# Patient Record
Sex: MALE | Race: WHITE | NOT HISPANIC OR LATINO | Employment: OTHER | URBAN - NONMETROPOLITAN AREA
[De-identification: names, ages, dates, MRNs, and addresses within clinical notes are randomized per-mention and may not be internally consistent; named-entity substitution may affect disease eponyms.]

---

## 2024-08-22 ENCOUNTER — TRANSCRIBE ORDERS (OUTPATIENT)
Dept: CARDIAC REHAB | Facility: HOSPITAL | Age: 64
End: 2024-08-22

## 2024-08-22 DIAGNOSIS — I20.9 ANGINA, CLASS III (CMS-HCC): Primary | ICD-10-CM

## 2024-08-23 ENCOUNTER — CLINICAL SUPPORT (OUTPATIENT)
Dept: CARDIAC REHAB | Facility: HOSPITAL | Age: 64
End: 2024-08-23
Payer: MEDICARE

## 2024-08-23 VITALS
DIASTOLIC BLOOD PRESSURE: 75 MMHG | BODY MASS INDEX: 41.04 KG/M2 | SYSTOLIC BLOOD PRESSURE: 123 MMHG | OXYGEN SATURATION: 97 % | HEIGHT: 68 IN | WEIGHT: 270.8 LBS

## 2024-08-23 ASSESSMENT — PATIENT HEALTH QUESTIONNAIRE - PHQ9
3. TROUBLE FALLING OR STAYING ASLEEP OR SLEEPING TOO MUCH: NOT AT ALL
7. TROUBLE CONCENTRATING ON THINGS, SUCH AS READING THE NEWSPAPER OR WATCHING TELEVISION: NOT AT ALL
SUM OF ALL RESPONSES TO PHQ QUESTIONS 1-9: 2
6. FEELING BAD ABOUT YOURSELF - OR THAT YOU ARE A FAILURE OR HAVE LET YOURSELF OR YOUR FAMILY DOWN: NOT AT ALL
4. FEELING TIRED OR HAVING LITTLE ENERGY: MORE THAN HALF THE DAYS
5. POOR APPETITE OR OVEREATING: NOT AT ALL
8. MOVING OR SPEAKING SO SLOWLY THAT OTHER PEOPLE COULD HAVE NOTICED. OR THE OPPOSITE, BEING SO FIGETY OR RESTLESS THAT YOU HAVE BEEN MOVING AROUND A LOT MORE THAN USUAL: NOT AT ALL
1. LITTLE INTEREST OR PLEASURE IN DOING THINGS: NOT AT ALL
SUM OF ALL RESPONSES TO PHQ QUESTIONS 1-9: 2
2. FEELING DOWN, DEPRESSED OR HOPELESS: NOT AT ALL
9. THOUGHTS THAT YOU WOULD BE BETTER OFF DEAD, OR OF HURTING YOURSELF: NOT AT ALL
SUM OF ALL RESPONSES TO PHQ9 QUESTIONS 1 & 2: 0

## 2024-08-23 ASSESSMENT — DUKE ACTIVITY SCORE INDEX (DASI)
CAN YOU PARTICIPATE IN STRENOUS SPORTS LIKE SWIMMING, SINGLES TENNIS, FOOTBALL, BASKETBALL, OR SKIING: NO
CAN YOU WALK A BLOCK OR TWO ON LEVEL GROUND: YES
CAN YOU TAKE CARE OF YOURSELF (EAT, DRESS, BATHE, OR USE TOILET): YES
CAN YOU HAVE SEXUAL RELATIONS: YES
CAN YOU CLIMB A FLIGHT OF STAIRS OR WALK UP A HILL: YES
CAN YOU DO LIGHT WORK AROUND THE HOUSE LIKE DUSTING OR WASHING DISHES: YES
CAN YOU DO YARD WORK LIKE RAKING LEAVES, WEEDING OR PUSHING A MOWER: YES
CAN YOU PARTICIPATE IN MODERATE RECREATIONAL ACTIVITIES LIKE GOLF, BOWLING, DANCING, DOUBLES TENNIS OR THROWING A BASEBALL OR FOOTBALL: YES
CAN YOU RUN A SHORT DISTANCE: YES
CAN YOU DO HEAVY WORK AROUND THE HOUSE LIKE SCRUBBING FLOORS OR LIFTING AND MOVING HEAVY FURNITURE: YES
DASI METS SCORE: 9
TOTAL_SCORE: 50.7
CAN YOU DO MODERATE WORK AROUND THE HOUSE LIKE VACUUMING, SWEEPING FLOORS OR CARRYING GROCERIES: YES
CAN YOU WALK INDOORS, SUCH AS AROUND YOUR HOUSE: YES

## 2024-08-23 NOTE — PROGRESS NOTES
"  Cardiac Rehabilitation Initial Treatment Plan    Name: Michael Mehta  Medical Record Number: [id number]  YOB: 1960  Age: 63 y.o.    Today’s Date: 8/23/2024  Primary Care Physician: Titi Benavidez MD  Referring Physician: Titi Benavidez MD  Program Location: [address]  Primary Diagnosis: Angina Class 3 (120.9)  Onset/Date of Diagnosis: 8/9/2024    Past Medical History  Severe Coronary artery Disease  CORNELIA  CABG 2015  Type 2 Diabetic  Hyperlipidemia  Right knee arthroscopic surgery  Orthostatic hypotension  Pilonidal cyst excision  Multiple cardiac stents      Initial Assessment, not yet started program.    AACVPR Risk Stratification: High (patient still having events of angina- no use of nitro). Patient also has multiple blockages per heart catherization report from 8/9/2024.      Falls Risk: Medium  Psychosocial Assessment     Pre PHQ-9: 2      Sent PH-Q 9 to MD if score > 20: No; score < 20    Pt reported/currently experiencing stress: Yes; Stress; Severity: moderate rated at 6 out of 10  Patient uses stress management skills: Yes   History of: anxiety anxiety reported at a 6 out of 10 score  Currently seeing a mental health provider: No  Social Support: Yes, Whom:Family and [employer]  Quality of Life Survey: SF-36   SF-36 Pre Post   Physical Component Score 39.58 TBD   Mental Component Score 50.65 TBD     Learning Assessment:  Learning assessment/barriers: None  Preferred learning method:  no preference  Barriers: None  Comments:    Stages of Change:Preparation    Psychosocial Plan    Goal Status: Initial Assessment; goals not yet started    Psychosocial Goals:  Decrease phq-9 score by at least 1 point. Patient scored a phq-9 score of 2. In category \"none to minimal. Decrease the amount of days the patient feels tired or has little energy from more than half the days to a few or none.   Learn Stress management skills and apply them to reported stressors while in the " "program.  Question on new or current psychological issues at least every 30 days.  Improve PCS and MCS scores by at least 5 points by discharge.  Educate patient on ways to reduce stress by discharge.  Provide 1:1 emotional support as needed and facilitate peer support within the context of the other phase 2 patients.       Psychosocial Interventions/Education:   Encourage patient to complete all emotional health and stress reduction activities and worksheets provided throughout the program. Patient verbalized understanding.      Initial Assessment:      Nutrition Assessment:    Hyperlipidemia: Yes     Lipids:   No results found for: \"CHOL\", \"HDL\", \"LDLF\", \"TRIG\"    Current Dietary Guidelines:  patient denies any current following of a diet plan  Barriers to dietary change: no    Diet Habit Survey: Picture Your Plate  Pre:  55  Post: To be done at discharge.    Diabetes Assessment    No results found for: \"HGBA1C\"    History of Diabetes: Yes Pt monitors BS at home: Yes   Frequency: 2 /day  FBS range: 180 - 320  Hypoglycemic Episodes: No     Weight Management  Height: 68 inches  Weight: 270.8 lbs  Waist: 53 inches  BMI:  41.18    Nutrition Plan    Goal Status: Initial Assessment; goals not yet started    Nutrition Goals:   Increase PYP score to greater than 60 by discharge. Patient scored 55 on PYP score. In category, \"a more healthful dietary pattern, with some room for improvement.\"   Provide education on nutritional aspects related to cardiac health and discuss dietary improvements while in the program.   Lose 1/2 inch from waist by discharge. (53\" On admission).  Gain knowledge on lipids and have a better understanding of lab results during the program.   Discuss PYP scores within the first 12 sessions of the program.  Learn ways to improve meal planning. Patient is a  and works long hours. 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 Patient has not managed packing or prepping food to eat " "throughout the day.   Refer to Diabetic educator for tips and suggestions on maintenance and diet planning.       Nutrition Interventions/Education:  Informed patient that nutritional topics will be discussed including following of the Mediterranean Diet. Patient verbalized understanding.   Explained to patient that education will be completed with book and workbook and expected for completion. Different topics will be discussed throughout the program. Patient acknowledged understanding.        Initial Assessment:      Exercise Assessment    No  Mode: NA  Frequency: NA  Duration: NA    Exercise Prescription     Exercise Prescription based on: 6 Minute Walk Test          Frequency:  3 days/week   Mode: Treadmill, NuStep, and Recumbent Cycle   Duration: >30 total aerobic minutes   Intensity: RPE <15  Target HR:   Rest+30  MET Level: 6 mw 2.80  Patient wears supplemental O2: No; patient wears CPAP at night     Modality Workload METs Duration (minutes)   1 Pre-Exercise   5:00   2 Treadmill 2.3  2.8 12 :00   3 Recumbent Bike 19w@1  2.8 12 :00   4 NuStep 20w@1  2.8 12 :00   5 Cooldown    5 :00   6 Post-Exercise   5:00    MAX HR: 124 bpm-132 bpm  Resistance Training: Yes, will begin at session 9   Home Exercise Prescription given: To be given at discharge.     DASI Score: 50.7  met score: 9    Exercise Plan    Goal Status: Initial Assessment; goals not yet started    Exercise Goals:   Increase mets to 4.0 by the end of the program.  Gain independence and confidence with exercise while in the program.  Have a plan to continue exercise after he completes the program.  Increase mets by 1.0 every 6 weeks as tolerated.      Exercise Interventions/Education:   \"What are exactly mets\" handout provided and discussed. Patient understood well.  NAGI handout provided and discussed. Patient verbalized understanding.          Initial Assessment:      Other Core Components/Risk Factor Assessment:    Medication adherence  Current " "Medications:    Norvasc 5 mg daily   Asprin 81 mg daily  Plavix 75mg daily  Amaryl 1 mg bid  Mevacor 40mg daily  Magnesium 400 mg 2 tabs BID  Glucophage 500mg BID  Lopressor 25 mg  (1/2 tab daily)                   Medication compliance: Yes   Uses pill box/organizer: Yes    Carries medication list: Yes     Blood Pressure Management  History of Hypertension: Yes   Medication Changes: Yes  recenty amlodapine 5 mg was told to take BID  Resting BP: 123/75    Heart Failure Management  Hx of Heart Failure: patient denies any history  Type (selection):  patient denies  Most recent EF: 40%    Smoking/Tobacco Assessment  Social History     Tobacco Use   Smoking Status Not on file   Smokeless Tobacco Not on file       Other Core Component Plan    Goal Status:  Initial assessment    Other Core Component Goals:   Maintain a resting BP of <130/80 while in the program  Increase knowledge test score from 10/15 to 15/15 by discharge.  Monitor blood pressure pre, during and post workout.  Meet and discuss knowledge test within the first 12 sessions.       Other Core Component Interventions/Education:  Educated the patient on the importance of carrying an updating medication list. Patient does carry a med list in wallet.  2.  Handout given, \"Tips on taking medication.\" Patient understood well.  3.  Handout, \"[facility] Benefits of Cardiac Rehab,\" and [facility]HA's \"What is Cardiac Rehab\" provided to patient. Explained what the goals of cardiac rehab are and program details such as nutritional and stress management.\" Patient understood well.      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 Patient completed all entry paperwork. Patient completed 6 minute walk with slight shortness of breath. Patient was able to walk 1240 feet and " obtained 2.80 mets. Patient walked at a speed of 2.34 mph. Patient did have slight pain in hips with walk. Patient reported pain at 3 out of 10. Patient still has angina at times that improves with rest. Dr. Titi Hart, referring physician is aware. Will monitor and follow up as needed and will follow cardiac rehab protocols. Patient has participated in a cardiac rehab in September 2023 at another facility.   Patient would like to begin cardiac rehab on Wednesday, August 28, 2024.    Rehab Staff Signature: Ambar Segundo, RRT

## 2024-08-28 ENCOUNTER — CLINICAL SUPPORT (OUTPATIENT)
Dept: CARDIAC REHAB | Facility: HOSPITAL | Age: 64
End: 2024-08-28
Payer: MEDICARE

## 2024-08-28 DIAGNOSIS — I20.9 ANGINA, CLASS III (CMS-HCC): Primary | ICD-10-CM

## 2024-08-28 PROCEDURE — 93798 PHYS/QHP OP CAR RHAB W/ECG: CPT | Performed by: STUDENT IN AN ORGANIZED HEALTH CARE EDUCATION/TRAINING PROGRAM

## 2024-08-28 PROCEDURE — 94618 PULMONARY STRESS TESTING: CPT | Performed by: STUDENT IN AN ORGANIZED HEALTH CARE EDUCATION/TRAINING PROGRAM

## 2024-08-30 ENCOUNTER — CLINICAL SUPPORT (OUTPATIENT)
Dept: CARDIAC REHAB | Facility: HOSPITAL | Age: 64
End: 2024-08-30
Payer: MEDICARE

## 2024-08-30 DIAGNOSIS — I20.9 ANGINA, CLASS III (CMS-HCC): Primary | ICD-10-CM

## 2024-08-30 PROCEDURE — 93798 PHYS/QHP OP CAR RHAB W/ECG: CPT | Performed by: STUDENT IN AN ORGANIZED HEALTH CARE EDUCATION/TRAINING PROGRAM

## 2024-09-04 ENCOUNTER — CLINICAL SUPPORT (OUTPATIENT)
Dept: CARDIAC REHAB | Facility: HOSPITAL | Age: 64
End: 2024-09-04
Payer: MEDICARE

## 2024-09-04 DIAGNOSIS — I20.9 ANGINA, CLASS III (CMS-HCC): Primary | ICD-10-CM

## 2024-09-04 PROCEDURE — 93798 PHYS/QHP OP CAR RHAB W/ECG: CPT | Performed by: STUDENT IN AN ORGANIZED HEALTH CARE EDUCATION/TRAINING PROGRAM

## 2024-09-06 ENCOUNTER — CLINICAL SUPPORT (OUTPATIENT)
Dept: CARDIAC REHAB | Facility: HOSPITAL | Age: 64
End: 2024-09-06
Payer: MEDICARE

## 2024-09-06 DIAGNOSIS — I20.9 ANGINA, CLASS III (CMS-HCC): Primary | ICD-10-CM

## 2024-09-06 PROCEDURE — 93798 PHYS/QHP OP CAR RHAB W/ECG: CPT | Performed by: STUDENT IN AN ORGANIZED HEALTH CARE EDUCATION/TRAINING PROGRAM

## 2024-09-09 ENCOUNTER — APPOINTMENT (OUTPATIENT)
Dept: CARDIAC REHAB | Facility: HOSPITAL | Age: 64
End: 2024-09-09
Payer: MEDICARE

## 2024-09-11 ENCOUNTER — CLINICAL SUPPORT (OUTPATIENT)
Dept: CARDIAC REHAB | Facility: HOSPITAL | Age: 64
End: 2024-09-11
Payer: MEDICARE

## 2024-09-11 DIAGNOSIS — I20.9 ANGINA, CLASS III (CMS-HCC): Primary | ICD-10-CM

## 2024-09-11 PROCEDURE — 93798 PHYS/QHP OP CAR RHAB W/ECG: CPT | Performed by: STUDENT IN AN ORGANIZED HEALTH CARE EDUCATION/TRAINING PROGRAM

## 2024-09-13 ENCOUNTER — CLINICAL SUPPORT (OUTPATIENT)
Dept: CARDIAC REHAB | Facility: HOSPITAL | Age: 64
End: 2024-09-13
Payer: MEDICARE

## 2024-09-13 DIAGNOSIS — I20.9 ANGINA, CLASS III (CMS-HCC): Primary | ICD-10-CM

## 2024-09-13 PROCEDURE — 93798 PHYS/QHP OP CAR RHAB W/ECG: CPT | Performed by: STUDENT IN AN ORGANIZED HEALTH CARE EDUCATION/TRAINING PROGRAM

## 2024-09-16 ENCOUNTER — CLINICAL SUPPORT (OUTPATIENT)
Dept: CARDIAC REHAB | Facility: HOSPITAL | Age: 64
End: 2024-09-16
Payer: MEDICARE

## 2024-09-16 DIAGNOSIS — I20.9 ANGINA, CLASS III (CMS-HCC): Primary | ICD-10-CM

## 2024-09-16 PROCEDURE — 93798 PHYS/QHP OP CAR RHAB W/ECG: CPT | Performed by: STUDENT IN AN ORGANIZED HEALTH CARE EDUCATION/TRAINING PROGRAM

## 2024-09-18 ENCOUNTER — CLINICAL SUPPORT (OUTPATIENT)
Dept: CARDIAC REHAB | Facility: HOSPITAL | Age: 64
End: 2024-09-18
Payer: MEDICARE

## 2024-09-18 DIAGNOSIS — I20.9 ANGINA, CLASS III (CMS-HCC): Primary | ICD-10-CM

## 2024-09-18 PROCEDURE — 93798 PHYS/QHP OP CAR RHAB W/ECG: CPT | Performed by: STUDENT IN AN ORGANIZED HEALTH CARE EDUCATION/TRAINING PROGRAM

## 2024-09-18 NOTE — PROGRESS NOTES
"  Cardiac Rehabilitation 30 Day Assessment    Name: Michael Mehta  Medical Record Number: [id number]  YOB: 1960  Age: 63 y.o.    Today’s Date: 9/18/2024  Primary Care Physician: Titi Benavidez MD  Referring Physician: Titi Benavidez MD  Program Location: [address]   Exercise Start Date: 8/28/2024    General  Primary Diagnosis: Angina Class 3 (120.9)  Onset/Date of Diagnosis: 8/9/2024    Past Medical History  Severe Coronary artery Disease  CORNELIA  CABG 2015  Type 2 Diabetic  Hyperlipidemia  Right knee arthroscopic surgery  Orthostatic hypotension  Pilonidal cyst excision  Multiple cardiac stents      AACVPR Risk Stratification: High (patient still having events of angina- no use of nitro). Patient also has multiple blockages per heart catherization report from 8/9/2024.      Falls Risk: Medium  Psychosocial Assessment     Pre PHQ-9: 2  Post PHQ-9: To be done at discharge:     Sent PH-Q 9 to MD if score > 20: No; score < 20    Pt reported/currently experiencing stress: Yes; Stress; Severity: moderate rated at 6 out of 10  Patient uses stress management skills: Yes   History of: anxiety anxiety reported at a 6 out of 10 score  Currently seeing a mental health provider: No  Social Support: Yes, Whom: Family and [employer]  Quality of Life Survey: SF-36   SF-36 Pre Post   Physical Component Score 39.58 TBD   Mental Component Score 50.65 TBD     Learning Assessment:  Learning assessment/barriers: None  Preferred learning method:  no preference  Barriers: None  Comments:    Stages of Change: Action    Psychosocial Plan    Goal Status: In progress    Psychosocial Goals:  Decrease phq-9 score by at least 1 point. Patient scored a phq-9 score of 2. In category \"none to minimal. Decrease the amount of days the patient feels tired or has little energy from more than half the days to a few or none ( in progress).   Learn Stress management skills and apply them to reported stressors while in the " "program (in progress).   Question on new or current psychological issues at least every 30 days (in progress).  Improve PCS and MCS scores by at least 5 points by discharge (in progress).  Educate patient on ways to reduce stress by discharge (in progress).  Provide 1:1 emotional support as needed and facilitate peer support within the context of the other phase 2 patients (in progress).      Psychosocial Interventions/Education:   Encourage patient to complete all emotional health and stress reduction activities and worksheets provided throughout the program. Patient verbalized understanding.  Stress reported at 6 out of 10. Patient feels he has been more \"down\" lately. Encouraged and offered suggestions. Patient given handouts on \"Stress management,\" and \"Stress less for a healthier heart.\" Patient acknowledged understanding, 09/18/2024.        Nutrition Assessment:    Hyperlipidemia: Yes     Lipids:   Obtained outside labs from [facility]:  07/27/2024    Cholesterol: 166  HDL: 35  Triglycerides: 170  LDL: 97      Current Dietary Guidelines:  patient denies any current following of a diet plan  Barriers to dietary change: no    Diet Habit Survey: Picture Your Plate  Pre:  55  Post: To be done at discharge.    Diabetes Assessment    Lab Results   Component Value Date    HGBA1C 7.0 (H) 03/14/2024       History of Diabetes: Yes Pt monitors BS at home: Yes   Frequency: 2 /day  FBS range: 180 - 320  Hypoglycemic Episodes: No     Weight Management  Height: 68 inches  Weight: 270.8 lbs  Waist: 53 inches  BMI:  41.18    Nutrition Plan    Goal Status: In progress    Nutrition Goals:   Increase PYP score to greater than 60 by discharge. Patient scored 55 on PYP score. In category, \"a more healthful dietary pattern, with some room for improvement.\"   Provide education on nutritional aspects related to cardiac health and discuss dietary improvements while in the program (in progress).  Lose 1/2 inch from waist by discharge. " "(53\" On admission). (In progress).  Gain knowledge on lipids and have a better understanding of lab results during the program (in progress).   Discuss PYP scores within the first 12 sessions of the program (Goal met).  Learn ways to improve meal planning. Patient is a  and works long hours. 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 Patient has not managed packing or prepping food to eat throughout the day (in progress).  Refer to Diabetic educator for tips and suggestions on maintenance and diet planning (in progress) (Goal met).      Nutrition Interventions/Education:  Informed patient that nutritional topics will be discussed including following of the Mediterranean Diet. Patient verbalized understanding.   Explained to patient that education will be completed with book and workbook and expected for completion. Different topics will be discussed throughout the program. Patient acknowledged understanding.  Went over PYP scores with patient. Patient still very unsure of what to eat when he is in his  most of the day and what to eat when he gets home. Patient primarily eats at gas stations. Provided tip sheet for improvement. Will develop and give patients portable meal and snack options, 09/04/2024.  Assigned Chapter 2: Nutrition. Assigned patient to read Mediterranean Diet and Food Pyramid. Provided handouts on \"Shopping for the Mediterranean Diet\" and \"Eating healty on a budget.\" Patient acknowledged understanding, 09/04/2024.  Provided patient with healthy snack options for purchase that would be healthy and quick and do not require refrigeration. Patient does not wish to carry a cooler or lunch bag while . Also provided patient with article, \"Healthy eating while Ary.\" The article provided suggestions on meal planning and ways to manage diet. Patient accepted and acknowledged understanding, 09/18/2024.   Referred to Diabetic Educator, 09/18/2024.      Exercise " "Assessment    No  Mode: NA  Frequency: NA  Duration: NA    Exercise Prescription     Exercise Prescription based on: 6 Minute Walk Test          Frequency:  3 days/week   Mode: Treadmill, NuStep, and Recumbent Cycle   Duration: >30 total aerobic minutes   Intensity: RPE <15  Target HR:   Rest+30  MET Level: 3.6  Patient wears supplemental O2: No; patient wears CPAP at night     Modality Workload METs Duration (minutes)   1 Pre-Exercise   5:00   2 Treadmill 2@2% 3.1 12 :00   3 Recumbent Bike 22w@3  2.2 12 :00   4 NuStep 46w@3 3.6 12 :00   5 Cooldown    5 :00   6 Post-Exercise   5:00    MAX HR: 124 bpm-132 bpm  Resistance Training: Yes, will begin at session 9   Home Exercise Prescription given: To be given at discharge.     DASI Score: 50.7  met score: 9    Exercise Plan    Goal Status: In progress    Exercise Goals:   Increase mets to 4.0 by the end of the program (in progress).  Gain independence and confidence with exercise while in the program (in progress).  Have a plan to continue exercise after he completes the program (in progress).  Increase mets by 1.0 every 6 weeks as tolerated (in progress).      Exercise Interventions/Education:   \"What are exactly mets\" handout provided and discussed. Patient understood well.  NAGI handout provided and discussed. Patient verbalized understanding.  Patient oriented to first day exercise routine. Patient shown and demonstrated warm up and cool down stretches, patient tolerated well, 08/28/2024.  Patient has not been exercising at home. Encouraged patient to begin exercising at home on days when not at rehab. Such as,  walking for 15-20 minutes daily. Patient acknowledged understanding, 09/18/2024.      Other Core Components/Risk Factor Assessment:    Medication adherence  Current Medications:    Norvasc 5 mg daily   Asprin 81 mg daily  Plavix 75mg daily  Amaryl 1 mg bid  Mevacor 40mg daily  Magnesium 400 mg 2 tabs BID  Glucophage 500mg BID  Lopressor 25 mg  (1/2 tab " "daily)                   Medication compliance: Yes   Uses pill box/organizer: Yes    Carries medication list: Yes     Blood Pressure Management  History of Hypertension: Yes   Medication Changes: Yes  recenty amlodapine 5 mg was told to take BID,   No current medication changes as of 09/18/2024.  Resting BP: 123/75  Resting Bp: 129/75 as of 09/18/2024.      Heart Failure Management  Hx of Heart Failure: patient denies any history  Type (selection):  patient denies  Most recent EF: 40%    Smoking/Tobacco Assessment  Social History     Tobacco Use   Smoking Status Not on file   Smokeless Tobacco Not on file       Other Core Component Plan    Goal Status:  In progress    Other Core Component Goals:   Maintain a resting BP of <130/80 while in the program (in progress).  Increase knowledge test score from 10/15 to 15/15 by discharge (in progress).  Monitor blood pressure pre, during and post workout (In progress).  Meet and discuss knowledge test within the first 12 sessions (Goal met).      Other Core Component Interventions/Education:  Educated the patient on the importance of carrying an updating medication list. Patient does carry a med list in wallet.  2.  Handout given, \"Tips on taking medication.\" Patient understood well.  3.  Handout, \"[facility] Benefits of Cardiac Rehab,\" and AHA's \"What is Cardiac Rehab\" provided to patient. Explained what the goals of cardiac rehab are and program details such as nutritional and stress management.\" Patient understood well.  4. Discussed and provided copy of Cardiac Knowledge Test results. Went over incorrect answers and provided rationale. Patient acknowledged understanding, 09/04/2024.  5. Chapter 1: Cardiac and Stroke assigned. Will discuss upon completion, 09/04/2024.    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 Patient has " completed 8 sessions. Patient has consistent attendance. Patient is giving best efforts with cardiac rehab but is struggling with exertion. Heart rhythm remains consistent with no changes. NSR shown on monitor. Will continue to encourage patient and patient will be given breaks as required. Patient is struggling with meal planning.  Patient is a  and eats mostly from gas stations. Provided meal planning options as well as snack options that are portable and healthy.  Patient accepted and was grateful for information. Patient is working at level 3 on both nustep and bike and is walking on treadmill at 2 mph with an incline of 2. Highest met obtained so far is 3.6. Patient works well with co-peers.   Rehab Staff Signature: Ambar Segundo, RRT

## 2024-09-20 ENCOUNTER — CLINICAL SUPPORT (OUTPATIENT)
Dept: CARDIAC REHAB | Facility: HOSPITAL | Age: 64
End: 2024-09-20
Payer: MEDICARE

## 2024-09-20 DIAGNOSIS — I20.9 ANGINA, CLASS III (CMS-HCC): Primary | ICD-10-CM

## 2024-09-20 PROCEDURE — 93798 PHYS/QHP OP CAR RHAB W/ECG: CPT | Performed by: STUDENT IN AN ORGANIZED HEALTH CARE EDUCATION/TRAINING PROGRAM

## 2024-09-23 ENCOUNTER — CLINICAL SUPPORT (OUTPATIENT)
Dept: CARDIAC REHAB | Facility: HOSPITAL | Age: 64
End: 2024-09-23
Payer: MEDICARE

## 2024-09-23 DIAGNOSIS — I20.9 ANGINA, CLASS III (CMS-HCC): Primary | ICD-10-CM

## 2024-09-23 PROCEDURE — 93798 PHYS/QHP OP CAR RHAB W/ECG: CPT | Performed by: STUDENT IN AN ORGANIZED HEALTH CARE EDUCATION/TRAINING PROGRAM

## 2024-09-25 ENCOUNTER — CLINICAL SUPPORT (OUTPATIENT)
Dept: CARDIAC REHAB | Facility: HOSPITAL | Age: 64
End: 2024-09-25
Payer: MEDICARE

## 2024-09-25 DIAGNOSIS — I20.9 ANGINA, CLASS III (CMS-HCC): Primary | ICD-10-CM

## 2024-09-25 PROCEDURE — 93798 PHYS/QHP OP CAR RHAB W/ECG: CPT | Performed by: STUDENT IN AN ORGANIZED HEALTH CARE EDUCATION/TRAINING PROGRAM

## 2024-09-27 ENCOUNTER — CLINICAL SUPPORT (OUTPATIENT)
Dept: CARDIAC REHAB | Facility: HOSPITAL | Age: 64
End: 2024-09-27
Payer: MEDICARE

## 2024-09-27 DIAGNOSIS — I20.9 ANGINA, CLASS III (CMS-HCC): Primary | ICD-10-CM

## 2024-09-27 PROCEDURE — 93798 PHYS/QHP OP CAR RHAB W/ECG: CPT | Performed by: STUDENT IN AN ORGANIZED HEALTH CARE EDUCATION/TRAINING PROGRAM

## 2024-09-30 ENCOUNTER — APPOINTMENT (OUTPATIENT)
Dept: CARDIAC REHAB | Facility: HOSPITAL | Age: 64
End: 2024-09-30
Payer: MEDICARE

## 2024-10-02 ENCOUNTER — CLINICAL SUPPORT (OUTPATIENT)
Dept: CARDIAC REHAB | Facility: HOSPITAL | Age: 64
End: 2024-10-02
Payer: MEDICARE

## 2024-10-02 DIAGNOSIS — I20.9 ANGINA, CLASS III (CMS-HCC): Primary | ICD-10-CM

## 2024-10-02 PROCEDURE — 93798 PHYS/QHP OP CAR RHAB W/ECG: CPT | Performed by: STUDENT IN AN ORGANIZED HEALTH CARE EDUCATION/TRAINING PROGRAM

## 2024-10-04 ENCOUNTER — CLINICAL SUPPORT (OUTPATIENT)
Dept: CARDIAC REHAB | Facility: HOSPITAL | Age: 64
End: 2024-10-04
Payer: MEDICARE

## 2024-10-04 DIAGNOSIS — I20.9 ANGINA, CLASS III (CMS-HCC): Primary | ICD-10-CM

## 2024-10-04 PROCEDURE — 93798 PHYS/QHP OP CAR RHAB W/ECG: CPT | Performed by: STUDENT IN AN ORGANIZED HEALTH CARE EDUCATION/TRAINING PROGRAM

## 2024-10-07 ENCOUNTER — APPOINTMENT (OUTPATIENT)
Dept: CARDIAC REHAB | Facility: HOSPITAL | Age: 64
End: 2024-10-07
Payer: MEDICARE

## 2024-10-07 NOTE — PROGRESS NOTES
"  Cardiac Rehabilitation 60 Day Assessment    Name: Michael Mehta  Medical Record Number: [id number]  YOB: 1960  Age: 64 y.o.    Today’s Date: 10/7/2024  Primary Care Physician: Titi Benavidez MD  Referring Physician: Titi Benavidez MD  Program Location: [address]   Exercise Start Date: 8/28/2024  Completed sessions: 14    General  Primary Diagnosis: Angina Class 3 (120.9)  Onset/Date of Diagnosis: 8/9/2024    Past Medical History  Severe Coronary artery Disease  [place]  CABG 2015  Type 2 Diabetic  Hyperlipidemia  Right knee arthroscopic surgery  Orthostatic hypotension  Pilonidal cyst excision  Multiple cardiac stents      AACVPR Risk Stratification: High (patient still having events of angina- no use of nitro). Patient also has multiple blockages per heart catherization report from 8/9/2024.      Falls Risk: Medium  Psychosocial Assessment     Pre PHQ-9: 2  Post PHQ-9: To be done at discharge:     Sent PH-Q 9 to MD if score > 20: No; score < 20    Pt reported/currently experiencing stress: Yes; Stress; Severity: moderate rated at 6 out of 10  Patient uses stress management skills: Yes   History of: anxiety anxiety reported at a 6 out of 10 score  Currently seeing a mental health provider: No  Social Support: Yes, Whom: Family and [employer]  Quality of Life Survey: SF-36   SF-36 Pre Post   Physical Component Score 39.58 TBD   Mental Component Score 50.65 TBD     Learning Assessment:  Learning assessment/barriers: None  Preferred learning method:  no preference  Barriers: None  Comments:    Stages of Change: Action    Psychosocial Plan    Goal Status: In progress    Psychosocial Goals:  Decrease phq-9 score by at least 1 point. Patient scored a phq-9 score of 2. In category \"none to minimal. Decrease the amount of days the patient feels tired or has little energy from more than half the days to a few or none ( in progress).   Learn Stress management skills and apply them to reported " "stressors while in the program (in progress).   Question on new or current psychological issues at least every 30 days (in progress).  Improve PCS and MCS scores by at least 5 points by discharge (in progress).  Educate patient on ways to reduce stress by discharge (in progress).  Provide 1:1 emotional support as needed and facilitate peer support within the context of the other phase 2 patients (in progress).      Psychosocial Interventions/Education:   Encourage patient to complete all emotional health and stress reduction activities and worksheets provided throughout the program. Patient verbalized understanding.  Stress reported at 6 out of 10. Patient feels he has been more \"down\" lately. Encouraged and offered suggestions. Patient given handouts on \"Stress management,\" and \"Stress less for a healthier heart.\" Patient acknowledged understanding, 2024.  Patient not available for assessment at this time. Patient is attending a  in [place] for a family member, 10/7/2024.        Nutrition Assessment:    Hyperlipidemia: Yes     Lipids:   Obtained outside labs from [facility]:  2024    Cholesterol: 166  HDL: 35  Triglycerides: 170  LDL: 97      Current Dietary Guidelines:  patient denies any current following of a diet plan  Barriers to dietary change: no    Diet Habit Survey: Picture Your Plate  Pre:  55  Post: To be done at discharge.    Diabetes Assessment    Lab Results   Component Value Date    HGBA1C 8.6 (H) 2024       History of Diabetes: Yes Pt monitors BS at home: Yes   Frequency: 2 /day  FBS range: 180 - 320  Hypoglycemic Episodes: No     Weight Management  Height: 68 inches  Weight: 270.8 lbs  Waist: 53 inches  BMI:  41.18    Nutrition Plan    Goal Status: In progress    Nutrition Goals:   Increase PYP score to greater than 60 by discharge. Patient scored 55 on PYP score. In category, \"a more healthful dietary pattern, with some room for improvement.\"   Provide education on " "nutritional aspects related to cardiac health and discuss dietary improvements while in the program (in progress).  Lose 1/2 inch from waist by discharge. (53\" On admission). (In progress).  Gain knowledge on lipids and have a better understanding of lab results during the program (in progress).   Discuss PYP scores within the first 12 sessions of the program (Goal met).  Learn ways to improve meal planning. Patient is a  and works long hours. 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 Patient has not managed packing or prepping food to eat throughout the day (Goal met).  Refer to Diabetic educator for tips and suggestions on maintenance and diet planning (in progress) (Goal met).      Nutrition Interventions/Education:  Informed patient that nutritional topics will be discussed including following of the Mediterranean Diet. Patient verbalized understanding.   Explained to patient that education will be completed with book and workbook and expected for completion. Different topics will be discussed throughout the program. Patient acknowledged understanding.  Went over PYP scores with patient. Patient still very unsure of what to eat when he is in his  most of the day and what to eat when he gets home. Patient primarily eats at gas stations. Provided tip sheet for improvement. Will develop and give patients portable meal and snack options, 09/04/2024.  Assigned Chapter 2: Nutrition. Assigned patient to read Mediterranean Diet and Food Pyramid. Provided handouts on \"Shopping for the Mediterranean Diet\" and \"Eating healty on a budget.\" Patient acknowledged understanding, 09/04/2024.  Provided patient with healthy snack options for purchase that would be healthy and quick and do not require refrigeration. Patient does not wish to carry a cooler or lunch bag while . Also provided patient with article, \"Healthy eating while Ary.\" The article provided suggestions on meal " "planning and ways to manage diet. Patient accepted and acknowledged understanding, 2024.   Referred to Diabetic Educator, 2024.  Provided patient with visual list of foods that are healthy options that are portable and diabetic friendly. Many of the foods provided could be purchased locally. Patient also shown diabetic snack options for purchase online. Patient was receptive, 24.  ( Patient did purchase many of the options provided from the list).      Exercise Assessment    No  Mode: NA  Frequency: NA  Duration: NA    Patient not available for assessment as he is at a  for a family member out of state, 10/7/2024.    Exercise Prescription     Exercise Prescription based on: 6 Minute Walk Test          Frequency:  3 days/week   Mode: Treadmill, NuStep, and Recumbent Cycle   Duration: >30 total aerobic minutes   Intensity: RPE <15  Target HR:   Rest+30  MET Level: 4.2  Patient wears supplemental O2: No; patient wears CPAP at night     Modality Workload METs Duration (minutes)   1 Pre-Exercise   5:00   2 Treadmill 2@2% 3.1 12 :00   3 Recumbent Bike 46w@3  3.5 12 :00   4 NuStep 68w@3 4.2 12 :00   5 Weights 3lb@10 3.1 5:00   6 Cooldown    5 :00   7 Post-Exercise   5:00    MAX HR: 124 bpm-132 bpm  Resistance Training: Yes, will begin at session 9   Home Exercise Prescription given: To be given at discharge.     DASI Score: 50.7  met score: 9    Exercise Plan    Goal Status: In progress    Exercise Goals:   Increase mets to 4.0 by the end of the program (in progress).  Gain independence and confidence with exercise while in the program (in progress).  Have a plan to continue exercise after he completes the program (in progress).  Increase mets by 1.0 every 6 weeks as tolerated (in progress).      Exercise Interventions/Education:   \"What are exactly mets\" handout provided and discussed. Patient understood well.  NAGI handout provided and discussed. Patient verbalized understanding.  Patient " oriented to first day exercise routine. Patient shown and demonstrated warm up and cool down stretches, patient tolerated well, 2024.  Patient has not been exercising at home. Encouraged patient to begin exercising at home on days when not at rehab. Such as,  walking for 15-20 minutes daily. Patient acknowledged understanding, 2024.  Patient not available for assessment. Patient is at a  for a family member out of state. 10/7/2024.  At last session, 10/4/2024, patient was working at level 3 on both Syndiant and bicycle, obtaining a high met of 4.2. Patient is tolerating walking at 2 mph with an incline of 2%.  Patient also began lifting 3lb weights and is tolerating well, 10/7/2024.      Other Core Components/Risk Factor Assessment:    Medication adherence  Current Medications:    Norvasc 5 mg daily   Asprin 81 mg daily  Plavix 75mg daily  Amaryl 1 mg bid  Mevacor 40mg daily  Magnesium 400 mg 2 tabs BID  Glucophage 500mg BID  Lopressor 25 mg  (1/2 tab daily)                   Medication compliance: Yes   Uses pill box/organizer: Yes    Carries medication list: Yes     Blood Pressure Management  History of Hypertension: Yes   Medication Changes: Yes  recenty amlodapine 5 mg was told to take BID,   No current medication changes as of 2024.  Resting BP: 123/75  Resting Bp: 129/75 as of 2024.  Resting Bp as of 10/4/2024: 131/78.      Heart Failure Management  Hx of Heart Failure: patient denies any history  Type (selection):  patient denies  Most recent EF: 40%    Smoking/Tobacco Assessment  Social History     Tobacco Use   Smoking Status Not on file   Smokeless Tobacco Not on file     Patient is a non-smoker    Other Core Component Plan    Goal Status:  In progress    Other Core Component Goals:   Maintain a resting BP of <130/80 while in the program (in progress).  Increase knowledge test score from 10/15 to 15/15 by discharge (in progress).  Monitor blood pressure pre, during and post  "workout (In progress).  Meet and discuss knowledge test within the first 12 sessions (Goal met).      Other Core Component Interventions/Education:  Educated the patient on the importance of carrying an updating medication list. Patient does carry a med list in wallet.  2.  Handout given, \"Tips on taking medication.\" Patient understood well.  3.  Handout, \"[facility] Benefits of Cardiac Rehab,\" and [facility]'s \"What is Cardiac Rehab\" provided to patient. Explained what the goals of cardiac rehab are and program details such as nutritional and stress management.\" Patient understood well.  4. Discussed and provided copy of Cardiac Knowledge Test results. Went over incorrect answers and provided rationale. Patient acknowledged understanding, 2024.  5. Chapter 1: Cardiac and Stroke assigned. Will discuss upon completion, 2024. Patient completed with accuracy.   6. Patient's blood pressures have been relatively stable and close to goals of less than 130/80. Last session BP on 10/4/2024, was 131/78. 10/7/2024.  7. Will assign chapter 5: High Blood Pressure and High Cholesterol on 10/9/2024 upon return to cardiac rehab.     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 Patient has completed 14 sessions. Patient has consistent attendance. Patient was not available for this particular session and assessment due to family death. Patient  was at a  [place] of state.  Patient has shown NSR on the monitor and reached 96% of total heart rate goal at last session.   Patient is working on level 3 on both Nustep and Bike and is walking at 2mph with an incline of 2%. Patient is tolerating exercise well.         "

## 2024-10-09 ENCOUNTER — CLINICAL SUPPORT (OUTPATIENT)
Dept: CARDIAC REHAB | Facility: HOSPITAL | Age: 64
End: 2024-10-09
Payer: MEDICARE

## 2024-10-09 DIAGNOSIS — I20.9 ANGINA, CLASS III (CMS-HCC): Primary | ICD-10-CM

## 2024-10-09 PROCEDURE — 93798 PHYS/QHP OP CAR RHAB W/ECG: CPT | Performed by: STUDENT IN AN ORGANIZED HEALTH CARE EDUCATION/TRAINING PROGRAM

## 2024-10-11 ENCOUNTER — CLINICAL SUPPORT (OUTPATIENT)
Dept: CARDIAC REHAB | Facility: HOSPITAL | Age: 64
End: 2024-10-11
Payer: MEDICARE

## 2024-10-11 DIAGNOSIS — I20.9 ANGINA, CLASS III (CMS-HCC): Primary | ICD-10-CM

## 2024-10-11 PROCEDURE — 93798 PHYS/QHP OP CAR RHAB W/ECG: CPT | Performed by: STUDENT IN AN ORGANIZED HEALTH CARE EDUCATION/TRAINING PROGRAM

## 2024-10-14 ENCOUNTER — CLINICAL SUPPORT (OUTPATIENT)
Dept: CARDIAC REHAB | Facility: HOSPITAL | Age: 64
End: 2024-10-14
Payer: MEDICARE

## 2024-10-14 DIAGNOSIS — I20.9 ANGINA, CLASS III (CMS-HCC): Primary | ICD-10-CM

## 2024-10-14 PROCEDURE — 93798 PHYS/QHP OP CAR RHAB W/ECG: CPT | Performed by: STUDENT IN AN ORGANIZED HEALTH CARE EDUCATION/TRAINING PROGRAM

## 2024-10-16 ENCOUNTER — CLINICAL SUPPORT (OUTPATIENT)
Dept: CARDIAC REHAB | Facility: HOSPITAL | Age: 64
End: 2024-10-16
Payer: MEDICARE

## 2024-10-16 DIAGNOSIS — I20.9 ANGINA, CLASS III (CMS-HCC): Primary | ICD-10-CM

## 2024-10-16 PROCEDURE — 93798 PHYS/QHP OP CAR RHAB W/ECG: CPT | Performed by: STUDENT IN AN ORGANIZED HEALTH CARE EDUCATION/TRAINING PROGRAM

## 2024-10-18 ENCOUNTER — CLINICAL SUPPORT (OUTPATIENT)
Dept: CARDIAC REHAB | Facility: HOSPITAL | Age: 64
End: 2024-10-18
Payer: MEDICARE

## 2024-10-18 DIAGNOSIS — I20.9 ANGINA, CLASS III (CMS-HCC): Primary | ICD-10-CM

## 2024-10-18 PROCEDURE — 93798 PHYS/QHP OP CAR RHAB W/ECG: CPT | Performed by: STUDENT IN AN ORGANIZED HEALTH CARE EDUCATION/TRAINING PROGRAM

## 2024-10-21 ENCOUNTER — CLINICAL SUPPORT (OUTPATIENT)
Dept: CARDIAC REHAB | Facility: HOSPITAL | Age: 64
End: 2024-10-21
Payer: MEDICARE

## 2024-10-21 DIAGNOSIS — I20.9 ANGINA, CLASS III (CMS-HCC): Primary | ICD-10-CM

## 2024-10-21 PROCEDURE — 93798 PHYS/QHP OP CAR RHAB W/ECG: CPT | Performed by: STUDENT IN AN ORGANIZED HEALTH CARE EDUCATION/TRAINING PROGRAM

## 2024-10-23 ENCOUNTER — CLINICAL SUPPORT (OUTPATIENT)
Dept: CARDIAC REHAB | Facility: HOSPITAL | Age: 64
End: 2024-10-23
Payer: MEDICARE

## 2024-10-23 DIAGNOSIS — I20.9 ANGINA, CLASS III (CMS-HCC): Primary | ICD-10-CM

## 2024-10-23 PROCEDURE — 93798 PHYS/QHP OP CAR RHAB W/ECG: CPT | Performed by: STUDENT IN AN ORGANIZED HEALTH CARE EDUCATION/TRAINING PROGRAM

## 2024-10-25 ENCOUNTER — CLINICAL SUPPORT (OUTPATIENT)
Dept: CARDIAC REHAB | Facility: HOSPITAL | Age: 64
End: 2024-10-25
Payer: MEDICARE

## 2024-10-25 DIAGNOSIS — I20.9 ANGINA, CLASS III (CMS-HCC): Primary | ICD-10-CM

## 2024-10-25 PROCEDURE — 93798 PHYS/QHP OP CAR RHAB W/ECG: CPT | Performed by: STUDENT IN AN ORGANIZED HEALTH CARE EDUCATION/TRAINING PROGRAM

## 2024-10-28 ENCOUNTER — CLINICAL SUPPORT (OUTPATIENT)
Dept: CARDIAC REHAB | Facility: HOSPITAL | Age: 64
End: 2024-10-28
Payer: MEDICARE

## 2024-10-28 DIAGNOSIS — S89.031D: Primary | ICD-10-CM

## 2024-10-28 PROCEDURE — 93798 PHYS/QHP OP CAR RHAB W/ECG: CPT | Performed by: STUDENT IN AN ORGANIZED HEALTH CARE EDUCATION/TRAINING PROGRAM

## 2024-10-30 ENCOUNTER — CLINICAL SUPPORT (OUTPATIENT)
Dept: CARDIAC REHAB | Facility: HOSPITAL | Age: 64
End: 2024-10-30
Payer: MEDICARE

## 2024-10-30 DIAGNOSIS — I20.9 ANGINA, CLASS III (CMS-HCC): Primary | ICD-10-CM

## 2024-10-30 PROCEDURE — 93798 PHYS/QHP OP CAR RHAB W/ECG: CPT | Performed by: STUDENT IN AN ORGANIZED HEALTH CARE EDUCATION/TRAINING PROGRAM

## 2024-11-01 ENCOUNTER — CLINICAL SUPPORT (OUTPATIENT)
Dept: CARDIAC REHAB | Facility: HOSPITAL | Age: 64
End: 2024-11-01
Payer: MEDICARE

## 2024-11-01 DIAGNOSIS — I20.9 ANGINA, CLASS III (CMS-HCC): Primary | ICD-10-CM

## 2024-11-01 PROCEDURE — 93798 PHYS/QHP OP CAR RHAB W/ECG: CPT | Performed by: STUDENT IN AN ORGANIZED HEALTH CARE EDUCATION/TRAINING PROGRAM

## 2024-11-04 ENCOUNTER — CLINICAL SUPPORT (OUTPATIENT)
Dept: CARDIAC REHAB | Facility: HOSPITAL | Age: 64
End: 2024-11-04
Payer: MEDICARE

## 2024-11-04 DIAGNOSIS — I20.9 ANGINA, CLASS III (CMS-HCC): Primary | ICD-10-CM

## 2024-11-04 PROCEDURE — 93798 PHYS/QHP OP CAR RHAB W/ECG: CPT | Performed by: STUDENT IN AN ORGANIZED HEALTH CARE EDUCATION/TRAINING PROGRAM

## 2024-11-06 ENCOUNTER — CLINICAL SUPPORT (OUTPATIENT)
Dept: CARDIAC REHAB | Facility: HOSPITAL | Age: 64
End: 2024-11-06
Payer: MEDICARE

## 2024-11-06 DIAGNOSIS — I20.9 ANGINA, CLASS III (CMS-HCC): Primary | ICD-10-CM

## 2024-11-06 PROCEDURE — 93798 PHYS/QHP OP CAR RHAB W/ECG: CPT | Performed by: STUDENT IN AN ORGANIZED HEALTH CARE EDUCATION/TRAINING PROGRAM

## 2024-11-08 ENCOUNTER — CLINICAL SUPPORT (OUTPATIENT)
Dept: CARDIAC REHAB | Facility: HOSPITAL | Age: 64
End: 2024-11-08
Payer: MEDICARE

## 2024-11-08 DIAGNOSIS — I20.9 ANGINA, CLASS III (CMS-HCC): Primary | ICD-10-CM

## 2024-11-08 PROCEDURE — 93798 PHYS/QHP OP CAR RHAB W/ECG: CPT | Performed by: STUDENT IN AN ORGANIZED HEALTH CARE EDUCATION/TRAINING PROGRAM

## 2024-11-11 ENCOUNTER — APPOINTMENT (OUTPATIENT)
Dept: CARDIAC REHAB | Facility: HOSPITAL | Age: 64
End: 2024-11-11
Payer: MEDICARE

## 2024-11-13 ENCOUNTER — CLINICAL SUPPORT (OUTPATIENT)
Dept: CARDIAC REHAB | Facility: HOSPITAL | Age: 64
End: 2024-11-13
Payer: MEDICARE

## 2024-11-13 DIAGNOSIS — I20.9 ANGINA, CLASS III (CMS-HCC): Primary | ICD-10-CM

## 2024-11-13 PROCEDURE — 93798 PHYS/QHP OP CAR RHAB W/ECG: CPT | Performed by: STUDENT IN AN ORGANIZED HEALTH CARE EDUCATION/TRAINING PROGRAM

## 2024-11-15 ENCOUNTER — CLINICAL SUPPORT (OUTPATIENT)
Dept: CARDIAC REHAB | Facility: HOSPITAL | Age: 64
End: 2024-11-15
Payer: MEDICARE

## 2024-11-15 DIAGNOSIS — I20.9 ANGINA, CLASS III (CMS-HCC): Primary | ICD-10-CM

## 2024-11-15 PROCEDURE — 93798 PHYS/QHP OP CAR RHAB W/ECG: CPT | Performed by: STUDENT IN AN ORGANIZED HEALTH CARE EDUCATION/TRAINING PROGRAM

## 2024-11-18 ENCOUNTER — CLINICAL SUPPORT (OUTPATIENT)
Dept: CARDIAC REHAB | Facility: HOSPITAL | Age: 64
End: 2024-11-18
Payer: MEDICARE

## 2024-11-18 DIAGNOSIS — I20.9 ANGINA, CLASS III (CMS-HCC): Primary | ICD-10-CM

## 2024-11-18 PROCEDURE — 93798 PHYS/QHP OP CAR RHAB W/ECG: CPT | Performed by: STUDENT IN AN ORGANIZED HEALTH CARE EDUCATION/TRAINING PROGRAM

## 2024-11-20 ENCOUNTER — CLINICAL SUPPORT (OUTPATIENT)
Dept: CARDIAC REHAB | Facility: HOSPITAL | Age: 64
End: 2024-11-20
Payer: MEDICARE

## 2024-11-20 DIAGNOSIS — I20.9 ANGINA, CLASS III (CMS-HCC): Primary | ICD-10-CM

## 2024-11-20 PROCEDURE — 93798 PHYS/QHP OP CAR RHAB W/ECG: CPT | Performed by: STUDENT IN AN ORGANIZED HEALTH CARE EDUCATION/TRAINING PROGRAM

## 2024-11-22 ENCOUNTER — APPOINTMENT (OUTPATIENT)
Dept: CARDIAC REHAB | Facility: HOSPITAL | Age: 64
End: 2024-11-22
Payer: MEDICARE

## 2024-11-22 DIAGNOSIS — I20.9 ANGINA, CLASS III (CMS-HCC): Primary | ICD-10-CM

## 2024-11-22 PROCEDURE — 93798 PHYS/QHP OP CAR RHAB W/ECG: CPT | Performed by: STUDENT IN AN ORGANIZED HEALTH CARE EDUCATION/TRAINING PROGRAM

## 2024-11-25 ENCOUNTER — CLINICAL SUPPORT (OUTPATIENT)
Dept: CARDIAC REHAB | Facility: HOSPITAL | Age: 64
End: 2024-11-25
Payer: MEDICARE

## 2024-11-25 DIAGNOSIS — I20.9 ANGINA, CLASS III (CMS-HCC): Primary | ICD-10-CM

## 2024-11-25 PROCEDURE — 93798 PHYS/QHP OP CAR RHAB W/ECG: CPT | Performed by: STUDENT IN AN ORGANIZED HEALTH CARE EDUCATION/TRAINING PROGRAM

## 2024-11-26 NOTE — PROGRESS NOTES
"  Cardiac Rehabilitation 120 Day Assessment    Name: Michael Mehta  Medical Record Number: [id number]  YOB: 1960  Age: 64 y.o.    Today’s Date: 11/26/2024  Primary Care Physician: Titi Benavidez MD  Referring Physician: Titi Benavidez MD  Program Location: [address]   Exercise Start Date: 8/28/2024  Completed sessions: 34    General  Primary Diagnosis: Angina Class 3 (120.9)  Onset/Date of Diagnosis: 8/9/2024    Past Medical History  Severe Coronary artery Disease  [place]  CABG 2015  Type 2 Diabetic  Hyperlipidemia  Right knee arthroscopic surgery  Orthostatic hypotension  Pilonidal cyst excision  Multiple cardiac stents      AACVPR Risk Stratification: High (patient still having events of angina- no use of nitro). Patient also has multiple blockages per heart catherization report from 8/9/2024.      Falls Risk: Medium  Psychosocial Assessment     Pre PHQ-9: 2  Post PHQ-9: To be done at discharge:     Sent PH-Q 9 to MD if score > 20: No; score < 20    Pt reported/currently experiencing stress: Yes; Stress; Severity: moderate rated at 6 out of 10  Patient uses stress management skills: Yes   History of: anxiety anxiety reported at a 6 out of 10 score  Currently seeing a mental health provider: No  Social Support: Yes, Whom: Family and [employer]  Quality of Life Survey: SF-36   SF-36 Pre Post   Physical Component Score 39.58 TBD   Mental Component Score 50.65 TBD     Learning Assessment:  Learning assessment/barriers: None  Preferred learning method:  no preference  Barriers: None  Comments:    Stages of Change: Action    Psychosocial Plan    Goal Status: In progress    Psychosocial Goals:  Decrease phq-9 score by at least 1 point. Patient scored a phq-9 score of 2. In category \"none to minimal. Decrease the amount of days the patient feels tired or has little energy from more than half the days to a few or none ( in progress).   Learn Stress management skills and apply them to " "reported stressors while in the program (Goal met).   Question on new or current psychological issues at least every 30 days (in progress).  Improve PCS and MCS scores by at least 5 points by discharge (in progress).  Educate patient on ways to reduce stress by discharge (Goal met).  Provide 1:1 emotional support as needed and facilitate peer support within the context of the other phase 2 patients (in progress).      Psychosocial Interventions/Education:   Encourage patient to complete all emotional health and stress reduction activities and worksheets provided throughout the program. Patient verbalized understanding.  Stress reported at 6 out of 10. Patient feels he has been more \"down\" lately. Encouraged and offered suggestions. Patient given handouts on \"Stress management,\" and \"Stress less for a healthier heart.\" Patient acknowledged understanding, 2024.  Patient not available for assessment at this time. Patient is attending a  in [place] for a family member, 10/7/2024.  Stress reported at 5 out of 10 as of 2024.   5.    Patient assigned Chapter 6 Quiz for completion, 2024. Will discuss any questions upon completion. Completed. No further questions, 2024.  6.   Stress reported at 5 out of 10 as of 2024.       Nutrition Assessment:    Hyperlipidemia: Yes     Lipids:   Obtained outside labs from [facility]:  2024    Cholesterol: 166  HDL: 35  Triglycerides: 170  LDL: 97      Current Dietary Guidelines:  patient denies any current following of a diet plan  Barriers to dietary change: no    Diet Habit Survey: Picture Your Plate  Pre:  55  Post: To be done at discharge.    Diabetes Assessment    Lab Results   Component Value Date    HGBA1C 8.6 (H) 2024       History of Diabetes: Yes Pt monitors BS at home: Yes   Frequency: 2 /day  FBS range: 180 - 320  Hypoglycemic Episodes: No     Weight Management  Height: 68 inches  Weight: 270.8 lbs  Waist: 53 inches  BMI:  " "41.18    Nutrition Plan    Goal Status: In progress    Nutrition Goals:   Increase PYP score to greater than 60 by discharge. Patient scored 55 on PYP score. In category, \"a more healthful dietary pattern, with some room for improvement.\"   Provide education on nutritional aspects related to cardiac health and discuss dietary improvements while in the program (in progress).  Lose 1/2 inch from waist by discharge. (53\" On admission). (In progress).  Gain knowledge on lipids and have a better understanding of lab results during the program (Goal met).   Discuss PYP scores within the first 12 sessions of the program (Goal met).  Learn ways to improve meal planning. Patient is a  and works long hours. 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 Patient has not managed packing or prepping food to eat throughout the day (Goal met).  Refer to Diabetic educator for tips and suggestions on maintenance and diet planning (in progress) (Goal met).      Nutrition Interventions/Education:  Informed patient that nutritional topics will be discussed including following of the Mediterranean Diet. Patient verbalized understanding.   Explained to patient that education will be completed with book and workbook and expected for completion. Different topics will be discussed throughout the program. Patient acknowledged understanding.  Went over PYP scores with patient. Patient still very unsure of what to eat when he is in his  most of the day and what to eat when he gets home. Patient primarily eats at gas stations. Provided tip sheet for improvement. Will develop and give patients portable meal and snack options, 09/04/2024.  Assigned Chapter 2: Nutrition. Assigned patient to read Mediterranean Diet and Food Pyramid. Provided handouts on \"Shopping for the Mediterranean Diet\" and \"Eating healty on a budget.\" Patient acknowledged understanding, 09/04/2024. Completed wit  Provided patient with healthy snack " "options for purchase that would be healthy and quick and do not require refrigeration. Patient does not wish to carry a cooler or lunch bag while . Also provided patient with article, \"Healthy eating while Ary.\" The article provided suggestions on meal planning and ways to manage diet. Patient accepted and acknowledged understanding, 2024.   Referred to Diabetic Educator, 2024.  Provided patient with visual list of foods that are healthy options that are portable and diabetic friendly. Many of the foods provided could be purchased locally. Patient also shown diabetic snack options for purchase online. Patient was receptive, 24.  ( Patient did purchase many of the options provided from the list).  Patient assigned Chapter 5 quizzes : Blood Pressure, High cholesterol, and Understanding Diabetes. Patient assigned to read \"Using Herbs instead of Salt,\" \" Consequences of High Blood Pressure,\" \"What is High Cholesterol,\" \"Understanding Diabetes,\" and \" Know your numbers,\" 2024.  Will discuss upon completion.  Patient completed, no further questions noted.   Patient reported he has began \"back to using [employer] Food Meal Plan.\" Patient reports this food plan worked well for him when he did it before and his blood sugar readings have already improved per patient, 2024.     Exercise Assessment    No  Mode: NA  Frequency: NA  Duration: NA    Patient not available for assessment as he is at a  for a family member out of state, 10/7/2024.  Patient reports he is not exercising at home. Patient states he has very little time with work and gets home late. Encouraged patient to walk more when he can or to obtain a health fitness tracker to see how many steps per day he is obtaining. Patient was interested and possibly may get a new tracker, 2024.  Spoke to patient in regards to continuing phase 3 exercise program to help with motivation once graduated. Patient finds it " "difficult to exercise at home when he is not at rehab. Handout given on Phase 3. Patient to think about it and decide as graduation approaches, 2024.     Exercise Prescription     Exercise Prescription based on: 6 Minute Walk Test          Frequency:  3 days/week   Mode: Treadmill, NuStep, and Recumbent Cycle   Duration: >30 total aerobic minutes   Intensity: RPE <15  Target HR:   Rest+30  MET Level: 3.8  Patient wears supplemental O2: No; patient wears CPAP at night     Modality Workload METs Duration (minutes)   1 Pre-Exercise   5:00   2 Treadmill 2.2@3% 3.6 12 :00   3 Recumbent Bike 50w@4 3.8 12 :00   4 NuStep T5 93w@4 2.6 12 :00   5 Weights 4lb@10 3.1 5:00   6 Cooldown    5 :00   7 Post-Exercise   5:00    MAX HR: 124 bpm-132 bpm  Resistance Training: Yes, will begin at session 9.  (Goal met). Patient tolerating lifting 4 lb. Weights.   Home Exercise Prescription given: To be given at discharge.     DASI Score: 50.7  met score: 9    Exercise Plan    Goal Status: In progress    Exercise Goals:   Increase mets to 4.0 by the end of the program (in progress).  Gain independence and confidence with exercise while in the program (in progress).  Have a plan to continue exercise after he completes the program (in progress).  Increase mets by 1.0 every 6 weeks as tolerated (in progress).      Exercise Interventions/Education:   \"What are exactly mets\" handout provided and discussed. Patient understood well.  NAGI handout provided and discussed. Patient verbalized understanding.  Patient oriented to first day exercise routine. Patient shown and demonstrated warm up and cool down stretches, patient tolerated well, 2024.  Patient has not been exercising at home. Encouraged patient to begin exercising at home on days when not at rehab. Such as,  walking for 15-20 minutes daily. Patient acknowledged understanding, 2024.  Patient not available for assessment. Patient is at a  for a family member out of " state. 10/7/2024.  At last session, 10/4/2024, patient was working at level 3 on both nustep and bicycle, obtaining a high met of 4.2. Patient is tolerating walking at 2 mph with an incline of 2%.  Patient also began lifting 3lb weights and is tolerating well, 10/7/2024.  Patient has not been exercising at home. Patient states his work and time he gets home is an issue and he feels he does not have time. Encouraged patient to find different ways to be active. Encouraged patient to get a fitness tracker to track steps and see how many steps each day he is obtaining. Patient was agreeable and interested in pursuing this idea, 11/1/2021.  Patient is working at level 4 on both nustep and bike. Patient works hard when exercising and tries to reach goals. Highest met obtained is 4.2. Patient is striving to reach first met goal, 11/1/2024.  Spoke to patient in regards to continuing phase 3 exercise program to help with motivation once graduated. Patient finds it difficult to exercise at home when he is not at rehab. Handout given on Phase 3. Patient to think about it and decide as graduation approaches, 11/25/2024.   10. Patient is currently working on level 4 on both Nu-step and Bicycle. Patient has increased on treadmill to a speed of 2.2 and an incline of 2% and increased to 4 lb. Hand-weights. Patient has great efforts and is working at a NAGI of 12-14, 11/25/2024.         Other Core Components/Risk Factor Assessment:    Medication adherence  Current Medications:    Norvasc 5 mg daily   Asprin 81 mg daily  Plavix 75mg daily  Amaryl 1 mg bid  Mevacor 40mg daily  Magnesium 400 mg 2 tabs BID  Glucophage 500mg BID  Lopressor 25 mg  (1/2 tab daily)                   Medication compliance: Yes   Uses pill box/organizer: Yes    Carries medication list: Yes     Blood Pressure Management  History of Hypertension: Yes   Medication Changes: Yes  recenty amlodapine 5 mg was told to take BID,   No current medication changes as of  "09/18/2024.  No medication changes as of 11/1/2024.  No medication changes as of 11/25/2024.    Resting BP: 123/75  Resting Bp: 129/75 as of 09/18/2024.  Resting Bp as of 10/4/2024: 131/78.  Resting BP as of 11/1/2024: 124/62  Resting Bp as of 11/25/2024: 119/68.    Heart Failure Management  Hx of Heart Failure: patient denies any history  Type (selection):  patient denies  Most recent EF: 40%    Smoking/Tobacco Assessment  Social History     Tobacco Use   Smoking Status Not on file   Smokeless Tobacco Not on file     Patient is a non-smoker    Other Core Component Plan    Goal Status:  In progress    Other Core Component Goals:   Maintain a resting BP of <130/80 while in the program (in progress).  Increase knowledge test score from 10/15 to 15/15 by discharge (in progress).  Monitor blood pressure pre, during and post workout (In progress).  Meet and discuss knowledge test within the first 12 sessions (Goal met).      Other Core Component Interventions/Education:  Educated the patient on the importance of carrying an updating medication list. Patient does carry a med list in wallet.  2.  Handout given, \"Tips on taking medication.\" Patient understood well.  3.  Handout, \"[facility] of Cardiac Re[facility],\" and [facility]'s \"What is Cardiac Rehab\" provided to patient. Explained what the goals of cardiac rehab are and program details such as nutritional and stress management.\" Patient understood well.  4. Discussed and provided copy of Cardiac Knowledge Test results. Went over incorrect answers and provided rationale. Patient acknowledged understanding, 09/04/2024.  5. Chapter 1: Cardiac and Stroke assigned. Will discuss upon completion, 09/04/2024. Patient completed with accuracy.  Completed with no further questions.   6. Patient's blood pressures have been relatively stable and close to goals of less than 130/80. Last session BP on 10/4/2024, was 131/78. 10/7/2024.  7. Will assign chapter 5: High Blood Pressure and High " Cholesterol on 10/9/2024 upon return to cardiac rehab. Assigned, 11/1/2024. Completed with no further questions.   8. Assigned Chapter 4: Medications Quiz. Will discuss upon completion, 11/1/2024.  Patient completed with no further questions.     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 Patient has completed 34 sessions. Patient gives good efforts. Patient struggles with time to plan food preparation and exercise time on days off from rehab. Patient was given handout and discussion regarding phase 3 for continued exercise post graduation. Patient was to think on the idea and make a decision as graduation approaches.  Patient meets target heart rate, showing NSR with no ectopy. Patient feels he has improved 40% since beginning cardiac rehab, 11/25/2024.

## 2024-11-27 ENCOUNTER — CLINICAL SUPPORT (OUTPATIENT)
Dept: CARDIAC REHAB | Facility: HOSPITAL | Age: 64
End: 2024-11-27
Payer: MEDICARE

## 2024-11-27 DIAGNOSIS — I20.9 ANGINA, CLASS III (CMS-HCC): Primary | ICD-10-CM

## 2024-11-27 PROCEDURE — 94618 PULMONARY STRESS TESTING: CPT | Performed by: STUDENT IN AN ORGANIZED HEALTH CARE EDUCATION/TRAINING PROGRAM

## 2024-11-27 PROCEDURE — 93798 PHYS/QHP OP CAR RHAB W/ECG: CPT | Performed by: STUDENT IN AN ORGANIZED HEALTH CARE EDUCATION/TRAINING PROGRAM

## 2024-11-29 ENCOUNTER — APPOINTMENT (OUTPATIENT)
Dept: CARDIAC REHAB | Facility: HOSPITAL | Age: 64
End: 2024-11-29
Payer: MEDICARE

## 2024-12-03 NOTE — PROGRESS NOTES
"  Cardiac Rehabilitation Discharge Summary    Name: Michael Mehta  Medical Record Number: [id number]  YOB: 1960  Age: 64 y.o.    Today’s Date: 12/4/4/24  Primary Care Physician: Titi Benavidez MD  Referring Physician: Titi Benavidez MD  Program Location: [address]   Exercise Start Date: 8/28/2024  Completed sessions: 35    General  Primary Diagnosis: Angina Class 3 (120.9)  Onset/Date of Diagnosis: 8/9/2024    Past Medical History  Severe Coronary artery Disease  [place]ELIA  CABG 2015  Type 2 Diabetic  Hyperlipidemia  Right knee arthroscopic surgery  Orthostatic hypotension  Pilonidal cyst excision  Multiple cardiac stents      AACVPR Risk Stratification: High (patient still having events of angina- no use of nitro). Patient also has multiple blockages per heart catherization report from 8/9/2024.      Falls Risk: Medium  Psychosocial Assessment     Pre PHQ-9: 2  Post PHQ-9: 2    Sent PH-Q 9 to MD if score > 20: No; score < 20    Pt reported/currently experiencing stress: Yes; Stress; Severity: moderate rated at 6 out of 10  Patient uses stress management skills: Yes   History of: anxiety anxiety reported at a 6 out of 10 score  Currently seeing a mental health provider: No  Social Support: Yes, Whom: Family and [employer]  Quality of Life Survey: SF-36   SF-36 Pre Post   Physical Component Score 39.58 48.24   Mental Component Score 50.65 60.91     Learning Assessment:  Learning assessment/barriers: None  Preferred learning method:  no preference  Barriers: None  Comments:    Stages of Change: Maintenance    Psychosocial Plan    Goal Status: Goal met    Psychosocial Goals:  Decrease phq-9 score by at least 1 point. Patient scored a phq-9 score of 2. In category \"none to minimal. Decrease the amount of days the patient feels tired or has little energy from more than half the days to a few or none (Goal partially met. Phq-9 maintained at a score of 2, which is in category, \"none to " "minimal\").   Learn Stress management skills and apply them to reported stressors while in the program (Goal met).   Question on new or current psychological issues at least every 30 days (Goal met).  Improve PCS and MCS scores by at least 5 points by discharge (Goal met).  Educate patient on ways to reduce stress by discharge (Goal met).  Provide 1:1 emotional support as needed and facilitate peer support within the context of the other phase 2 patients (Goal met).      Psychosocial Interventions/Education:   Encourage patient to complete all emotional health and stress reduction activities and worksheets provided throughout the program. Patient verbalized understanding.  Stress reported at 6 out of 10. Patient feels he has been more \"down\" lately. Encouraged and offered suggestions. Patient given handouts on \"Stress management,\" and \"Stress less for a healthier heart.\" Patient acknowledged understanding, 2024.  Patient not available for assessment at this time. Patient is attending a  in [place] for a family member, 10/7/2024.  Stress reported at 5 out of 10 as of 2024.   5.    Patient assigned Chapter 6 Quiz for completion, 2024. Will discuss any questions upon completion. Completed. No further questions, 2024.  6.   Stress reported at 5 out of 10 as of 2024.   7.  Stress reported at 5 out of 10 at discharge as of 24.        Nutrition Assessment:    Hyperlipidemia: Yes     Lipids:   Obtained outside labs from [facility]:  2024    Cholesterol: 166  HDL: 35  Triglycerides: 170  LDL: 97      Current Dietary Guidelines:  patient denies any current following of a diet plan  Barriers to dietary change: no    Diet Habit Survey: Picture Your Plate  Pre:  55  Post: 74      Diabetes Assessment    Lab Results   Component Value Date    HGBA1C 8.6 (H) 2024       History of Diabetes: Yes Pt monitors BS at home: Yes   Frequency: 2 /day  FBS range: 180 - 320  Hypoglycemic " "Episodes: No     Weight Management  Height: 68 inches  Pre: Weight: 270.8 lbs   Post Weight: 258 lbs.  Pre: Waist: 53 inches     Post Waist: 51 inches  Pre: BMI:  41.18             Post BMI: 39.24    Nutrition Plan    Goal Status: Goal met    Nutrition Goals:   Increase PYP score to greater than 60 by discharge. Patient scored 55 on PYP score. In category, \"a more healthful dietary pattern, with some room for improvement.\"   Provide education on nutritional aspects related to cardiac health and discuss dietary improvements while in the program (Goal met).  Lose 1/2 inch from waist by discharge. (53\" On admission). (Goal met).  Gain knowledge on lipids and have a better understanding of lab results during the program (Goal met).   Discuss PYP scores within the first 12 sessions of the program (Goal met).  Learn ways to improve meal planning. Patient is a  and works long hours. 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 Patient has not managed packing or prepping food to eat throughout the day (Goal met).  Refer to Diabetic educator for tips and suggestions on maintenance and diet planning (in progress) (Goal met).      Nutrition Interventions/Education:  Informed patient that nutritional topics will be discussed including following of the Mediterranean Diet. Patient verbalized understanding.   Explained to patient that education will be completed with book and workbook and expected for completion. Different topics will be discussed throughout the program. Patient acknowledged understanding.  Went over PYP scores with patient. Patient still very unsure of what to eat when he is in his  most of the day and what to eat when he gets home. Patient primarily eats at gas stations. Provided tip sheet for improvement. Will develop and give patients portable meal and snack options, 09/04/2024.  Assigned Chapter 2: Nutrition. Assigned patient to read Mediterranean Diet and Food Pyramid. Provided " "handouts on \"Shopping for the Mediterranean Diet\" and \"Eating healty on a budget.\" Patient acknowledged understanding, 2024. Completed wit  Provided patient with healthy snack options for purchase that would be healthy and quick and do not require refrigeration. Patient does not wish to carry a cooler or lunch bag while . Also provided patient with article, \"Healthy eating while Ary.\" The article provided suggestions on meal planning and ways to manage diet. Patient accepted and acknowledged understanding, 2024.   Referred to Diabetic Educator, 2024.  Provided patient with visual list of foods that are healthy options that are portable and diabetic friendly. Many of the foods provided could be purchased locally. Patient also shown diabetic snack options for purchase online. Patient was receptive, 24.  ( Patient did purchase many of the options provided from the list).  Patient assigned Chapter 5 quizzes : Blood Pressure, High cholesterol, and Understanding Diabetes. Patient assigned to read \"Using Herbs instead of Salt,\" \" Consequences of High Blood Pressure,\" \"What is High Cholesterol,\" \"Understanding Diabetes,\" and \" Know your numbers,\" 2024.  Will discuss upon completion.  Patient completed, no further questions noted.   Patient reported he has began \"back to using [employer] Meal Plan.\" Patient reports this food plan worked well for him when he did it before and his blood sugar readings have already improved per patient, 2024.   Patient improved discharge PYP score from 55 to 74. Patient reported that is one of the biggest changes he has made since being in cardiac rehab is that he has changed what he eats and is trying to lose weight, 24.  Patient lost 12.8 pounds and lost 2 inches from waistline at discharge, 24.    Exercise Assessment    No  Mode: NA  Frequency: NA  Duration: NA    Patient not available for assessment as he is at a  for " "a family member out of state, 10/7/2024.  Patient reports he is not exercising at home. Patient states he has very little time with work and gets home late. Encouraged patient to walk more when he can or to obtain a health fitness tracker to see how many steps per day he is obtaining. Patient was interested and possibly may get a new tracker, 11/1/2024.  Spoke to patient in regards to continuing phase 3 exercise program to help with motivation once graduated. Patient finds it difficult to exercise at home when he is not at rehab. Handout given on Phase 3. Patient to think about it and decide as graduation approaches, 11/25/2024.   Patient has made preparations for exercise after graduation and will be going to [employer] for continued maintenance, 11/27/24.    Exercise Prescription     Exercise Prescription based on: 6 Minute Walk Test          Frequency:  3 days/week   Mode: Treadmill, NuStep, and Recumbent Cycle   Duration: >30 total aerobic minutes   Intensity: RPE <15  Target HR:   Rest+30  MET Level: 4.5  Patient wears supplemental O2: No; patient wears CPAP at night     Modality Workload METs Duration (minutes)   1 Pre-Exercise   5:00   2 Treadmill 2.2@3% 3.6 12 :00   3 Recumbent Bike 50w@4 3.8 12 :00   4 NuStep T5 78w@4 4.5 12 :00   5 Weights 4lb@10 3.1 5:00   6 Cooldown    5 :00   7 Post-Exercise   5:00    MAX HR: 124 bpm-132 bpm  Resistance Training: Yes, will begin at session 9.  (Goal met). Patient tolerating lifting 4 lb. Weights.   Home Exercise Prescription given: (Goal met).     DASI Score: 50.7  met score: 9    Exercise Plan    Goal Status: Goal met    Exercise Goals:   Increase mets to 4.0 by the end of the program (Goal met).  Gain independence and confidence with exercise while in the program (Goal met).  Have a plan to continue exercise after he completes the program (Goal met).  Increase mets by 1.0 every 6 weeks as tolerated (Goal met).      Exercise Interventions/Education:   \"What are " "exactly mets\" handout provided and discussed. Patient understood well.  NAGI handout provided and discussed. Patient verbalized understanding.  Patient oriented to first day exercise routine. Patient shown and demonstrated warm up and cool down stretches, patient tolerated well, 2024.  Patient has not been exercising at home. Encouraged patient to begin exercising at home on days when not at rehab. Such as,  walking for 15-20 minutes daily. Patient acknowledged understanding, 2024.  Patient not available for assessment. Patient is at a  for a family member out of state. 10/7/2024.  At last session, 10/4/2024, patient was working at level 3 on both nustep and bicycle, obtaining a high met of 4.2. Patient is tolerating walking at 2 mph with an incline of 2%.  Patient also began lifting 3lb weights and is tolerating well, 10/7/2024.  Patient has not been exercising at home. Patient states his work and time he gets home is an issue and he feels he does not have time. Encouraged patient to find different ways to be active. Encouraged patient to get a fitness tracker to track steps and see how many steps each day he is obtaining. Patient was agreeable and interested in pursuing this idea, 2021.  Patient is working at level 4 on both [employer] and bike. Patient works hard when exercising and tries to reach goals. Highest met obtained is 4.2. Patient is striving to reach first met goal, 2024.  Spoke to patient in regards to continuing phase 3 exercise program to help with motivation once graduated. Patient finds it difficult to exercise at home when he is not at rehab. Handout given on Phase 3. Patient to think about it and decide as graduation approaches, 2024.   10. Patient is currently working on level 4 on both Nu-step and Bicycle. Patient has increased on treadmill to a speed of 2.2 and an incline of 2% and increased to 4 lb. Hand-weights. Patient has great efforts and is working at a " "NAGI of 12-14, 11/25/2024.   11.  Patient finished last day of cardiac rehab with a high met score of 4.5. Patient worked at level 4 on both Nustep and bike. Patient was able to walk 2.2 mph with 3% incline and lifted 4lb. Weights. Patient tolerated well, 11/27/24.  12. Patient completed discharge 6 minute walk with slight shortness of breath. Patient was able to walk +192 more feet than on admission. Patient obtained 3.07 mets, 11/27/24.  13. Patient given home exercise prescription and explained that recommendations are for 150 minutes of cardiac exercise weekly. Patient acknowledged understanding, 11/27/24.  14. Patient given handout, \"What happens when you stop exercising.\" Patient accepted and acknowledged, 11/27/24.  15. Patient brought in book to show he had completed Chapter 3: Exercise More Quiz. All answers correctly answered, 11/27/24.        Other Core Components/Risk Factor Assessment:    Medication adherence  Current Medications:    Norvasc 5 mg daily   Asprin 81 mg daily  Plavix 75mg daily  Amaryl 1 mg bid  Mevacor 40mg daily  Magnesium 400 mg 2 tabs BID  Glucophage 500mg BID  Lopressor 25 mg  (1/2 tab daily)                   Medication compliance: Yes   Uses pill box/organizer: Yes    Carries medication list: Yes     Blood Pressure Management  History of Hypertension: Yes   Medication Changes: Yes  recenty amlodapine 5 mg was told to take BID,   No current medication changes as of 09/18/2024.  No medication changes as of 11/1/2024.  No medication changes as of 11/25/2024.  No medication changes as of 11/27/2024 at discharge    Resting BP: 123/75  Resting Bp: 129/75 as of 09/18/2024.  Resting Bp as of 10/4/2024: 131/78.  Resting BP as of 11/1/2024: 124/62  Resting Bp as of 11/25/2024: 119/68.  Discharge resting BP: 123/67 within goal guidelines, 11/27/24.    Heart Failure Management  Hx of Heart Failure: patient denies any history  Type (selection):  patient denies  Most recent EF: " "40%    Smoking/Tobacco Assessment  Social History     Tobacco Use   Smoking Status Not on file   Smokeless Tobacco Not on file     Patient is a non-smoker    Other Core Component Plan    Goal Status:  Goal met    Other Core Component Goals:   Maintain a resting BP of <130/80 while in the program (Goal met).  Increase knowledge test score from 10/15 to 15/15 by discharge (Goal partially met).  Monitor blood pressure pre, during and post workout (Goal met).  Meet and discuss knowledge test within the first 12 sessions (Goal met).      Other Core Component Interventions/Education:  Educated the patient on the importance of carrying an updating medication list. Patient does carry a med list in wallet.  2.  Handout given, \"Tips on taking medication.\" Patient understood well.  3.  Handout, \"[facility] Benefits of Cardiac Re[facility],\" and [facility]HA's \"What is Cardiac Rehab\" provided to patient. Explained what the goals of cardiac rehab are and program details such as nutritional and stress management.\" Patient understood well.  4. Discussed and provided copy of Cardiac Knowledge Test results. Went over incorrect answers and provided rationale. Patient acknowledged understanding, 09/04/2024.  5. Chapter 1: Cardiac and Stroke assigned. Will discuss upon completion, 09/04/2024. Patient completed with accuracy.  Completed with no further questions.   6. Patient's blood pressures have been relatively stable and close to goals of less than 130/80. Last session BP on 10/4/2024, was 131/78. 10/7/2024.  7. Will assign chapter 5: High Blood Pressure and High Cholesterol on 10/9/2024 upon return to cardiac rehab. Assigned, 11/1/2024. Completed with no further questions.   8. Assigned Chapter 4: Medications Quiz. Will discuss upon completion, 11/1/2024.  Patient completed with no further questions.   9. Improved knowledge score from 10 out of 15 to 11 out of 15 as of discharge, 11/27/24  10. Patient's blood pressure remained within guidelines of less " "than 130/80 at discharge, 11/27/24.    Knowledge Test Scores  Pre: 10 out of 15  Post: 11 out of 15    Individual Patient Goals:    Improve fatigue (Goal met).  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     Goal Status: Goal met    Staff Comments:  Discharge Summary. Patient Completed 35 sessions. Patient improved in many areas. Psychosocially patient maintained at a score of 2 within category of \"minimal to none\" on phq-9, but improved substantially on SF-36 by achieving scores +5 points on both both MCS and PCS scores. Patient nutritionally increased from 55 to 74; with noted improvements in nutrition choices reported by patient. Patient lost -12.8 lbs and  2 inches from waist. Patient also  improved on knowledge score from 10 out of 15 to 11 out of 15.  Patient completed discharge 6 minute walk and was able to walk +192 more feet with only slight shortness of breath noted. The patient reached met goal and surpassed goal of 4.0 by obtaining 4.5 mets on the Nustep. The patient plans on continuing exercise at [facility] [employer]. Overall, the patient feels he improved 40% since beginning cardiac rehab. It has been a pleasure helping with the care of this patient.   "

## 2025-07-28 PROBLEM — Z98.61 POST PTCA: Status: ACTIVE | Noted: 2025-07-28

## 2025-08-04 ENCOUNTER — CLINICAL SUPPORT (OUTPATIENT)
Dept: CARDIAC REHAB | Facility: HOSPITAL | Age: 65
End: 2025-08-04
Payer: MEDICARE

## 2025-08-07 VITALS
OXYGEN SATURATION: 97 % | SYSTOLIC BLOOD PRESSURE: 124 MMHG | HEIGHT: 68 IN | BODY MASS INDEX: 38.83 KG/M2 | DIASTOLIC BLOOD PRESSURE: 85 MMHG | WEIGHT: 256.2 LBS

## 2025-08-07 ASSESSMENT — DUKE ACTIVITY SCORE INDEX (DASI)
CAN YOU DO MODERATE WORK AROUND THE HOUSE LIKE VACUUMING, SWEEPING FLOORS OR CARRYING GROCERIES: YES
CAN YOU WALK INDOORS, SUCH AS AROUND YOUR HOUSE: YES
CAN YOU HAVE SEXUAL RELATIONS: NO
CAN YOU PARTICIPATE IN STRENOUS SPORTS LIKE SWIMMING, SINGLES TENNIS, FOOTBALL, BASKETBALL, OR SKIING: NO
TOTAL_SCORE: 34.7
CAN YOU RUN A SHORT DISTANCE: NO
CAN YOU WALK A BLOCK OR TWO ON LEVEL GROUND: NO
CAN YOU DO HEAVY WORK AROUND THE HOUSE LIKE SCRUBBING FLOORS OR LIFTING AND MOVING HEAVY FURNITURE: YES
CAN YOU CLIMB A FLIGHT OF STAIRS OR WALK UP A HILL: YES
CAN YOU TAKE CARE OF YOURSELF (EAT, DRESS, BATHE, OR USE TOILET): YES
DASI METS SCORE: 7
CAN YOU DO YARD WORK LIKE RAKING LEAVES, WEEDING OR PUSHING A MOWER: YES
CAN YOU PARTICIPATE IN MODERATE RECREATIONAL ACTIVITIES LIKE GOLF, BOWLING, DANCING, DOUBLES TENNIS OR THROWING A BASEBALL OR FOOTBALL: YES
CAN YOU DO LIGHT WORK AROUND THE HOUSE LIKE DUSTING OR WASHING DISHES: YES

## 2025-08-07 ASSESSMENT — PATIENT HEALTH QUESTIONNAIRE - PHQ9
9. THOUGHTS THAT YOU WOULD BE BETTER OFF DEAD, OR OF HURTING YOURSELF: NOT AT ALL
8. MOVING OR SPEAKING SO SLOWLY THAT OTHER PEOPLE COULD HAVE NOTICED. OR THE OPPOSITE, BEING SO FIGETY OR RESTLESS THAT YOU HAVE BEEN MOVING AROUND A LOT MORE THAN USUAL: NOT AT ALL
6. FEELING BAD ABOUT YOURSELF - OR THAT YOU ARE A FAILURE OR HAVE LET YOURSELF OR YOUR FAMILY DOWN: NOT AT ALL
3. TROUBLE FALLING OR STAYING ASLEEP OR SLEEPING TOO MUCH: NOT AT ALL
SUM OF ALL RESPONSES TO PHQ9 QUESTIONS 1 & 2: 0
1. LITTLE INTEREST OR PLEASURE IN DOING THINGS: NOT AT ALL
5. POOR APPETITE OR OVEREATING: NOT AT ALL
4. FEELING TIRED OR HAVING LITTLE ENERGY: MORE THAN HALF THE DAYS
SUM OF ALL RESPONSES TO PHQ QUESTIONS 1-9: 2
7. TROUBLE CONCENTRATING ON THINGS, SUCH AS READING THE NEWSPAPER OR WATCHING TELEVISION: NOT AT ALL
2. FEELING DOWN, DEPRESSED OR HOPELESS: NOT AT ALL
SUM OF ALL RESPONSES TO PHQ QUESTIONS 1-9: 2

## 2025-08-07 NOTE — PROGRESS NOTES
"  Cardiac Rehabilitation Initial Treatment Plan    Name: Michael Mehta  Medical Record Number: [id number]  YOB: 1960  Age: 64 y.o.    Today’s Date: 8/7/2025  Primary Care Physician: Titi Benavidez MD  Referring Physician: Titi Benavidez,*  Program Location: [address]  Primary Diagnosis: Post PTCA    Onset/Date of Diagnosis: 07/06/2025    Initial Assessment: New assessment, not yet started    AACVPR Risk Stratification: High (Patient is still experiencing Angina, Cardiologist is aware patient states.)     Falls Risk: Medium  Psychosocial Assessment     Pre PHQ-9: 2  Post PHQ-9: -- (TBD)      Sent PH-Q 9 to MD if score > 20: No; score < 20    Pt reported/currently experiencing stress: Yes; Stress; Severity: moderate  Patient uses stress management skills: Yes   History of: no history of anxiety or depression  Currently seeing a mental health provider: No  Social Support: Yes, Whom:Brother and Worship  Quality of Life Survey: SF-36   SF-36 Pre Post   Physical Component Score 30.48 TBD   Mental Component Score 66.17 TBD     Learning Assessment:  Learning assessment/barriers: None  Preferred learning method: no preference  Barriers: None  Comments: Explained education to come and workbook that will be provided to patient    Stages of Change:Preparation    Psychosocial Plan    Goal Status: Initial Assessment; goals not yet started    Psychosocial Goals:   Decrease phq-9 score by 2 points  or maintain current score. ( Patient scored in \"none to minimal\" category, score of 2).  Learn and utilize stress management skills and apply them to reported stressors while in the program.  Question on new or current psychological issues at least every 30 days.  Improve PCS and MCS scores by at least 5 points by discharge.  Educate patient on ways to reduce stress and the importance of stress management in regards to cardiac health.  Provide 1:1 emotional support as needed and " "facilitate peer support within the context of the other phase 2 patients.       Psychosocial Interventions/Education:   Encourage patient to complete all emotional health and stress reduction activities and worksheets provided throughout the program. Patient verbalized understanding. 08/04/2025      Initial Assessment: Patient has no concerns at this time. Patient has a support system at home.    Nutrition Assessment:    Hyperlipidemia: Yes     Lipids: 11/30/2024  CHOL 127  HDL 34  TRIG 150  LDL 70    Current Dietary Guidelines: No   Barriers to dietary change: no    Diet Habit Survey: Picture Your Plate  Pre: 61  Post: To be done at discharge.    Diabetes Assessment    Lab Results   Component Value Date    HGBA1C 8.6 (H) 09/18/2024       History of Diabetes: Yes Pt monitors BS at home: Yes   Frequency: 2 /day  FBS range: 120 - 130  Hypoglycemic Episodes: No     Weight Management    Height: 172.7 cm (5' 8\")  Weight: 116 kg (256 lb 3.2 oz)  BMI (Calculated): 38.96  No data recorded    Nutrition Plan    Goal Status: Initial Assessment; goals not yet started    Nutrition Goals:   Maintain PYP score of greater than 60 by discharge. (On admission , patient scored 61 which is in category, \" A healthful dietary pattern, although there may be some specific eating behaviors that could be improved.\"  Provide education on nutritional aspects related to cardiac health and discuss dietary improvements while in the program.   Lose 1/2 inch from waist by discharge. ( 50.5 \" On admission).  Gain knowledge on lipids and have a better understanding of lab results during the program.   Discuss PYP scores within the first 12 sessions of the program.       Nutrition Interventions/Education:   Informed patient that nutritional topics will be discussed including following of the Mediterranean Diet. Patient verbalized understanding. 08/06/2025  2.   Explained to patient that education will be completed with book and workbook and expected " "for completion.  Discussed with patient that there will be several different topics over the 12 weeks.   Patient acknowledged understanding. 08/06/2025      Initial Assessment: Patient prepare some of his meals, but majority is prepared by someone else    Exercise Assessment    Home Exercise: No  Mode: NA  Frequency: NA  Duration: NA    Exercise Prescription     Exercise Prescription based on: 6 Minute Walk Test    6MWT: Yes  Pre Test O2 Sat/HR: 97/75  6 Minute O2 Sat/HR: 99/77  Distance Walked(ft): 1040  Margret Dyspnea:  (moderate)  Margret PRE: 1  Post Test O2 Sat/HR: 98/73  Adverse Reactions : none     Frequency:  3 days/week   Mode: Treadmill, NuStep, and Recumbent Cycle   Duration: >30 total aerobic minutes   Intensity: RPE <15  Target HR:  Rest+30  MET Level: 2.5  Patient wears supplemental O2: No     Modality Workload METs Duration (minutes)   1 Pre-Exercise      2 Treadmill 1.9 mph  2.5 12 :00   3 NuStep 16 mathew @ lvl 1  2.6 12 :00   4 Recumbent Bike 25 mathew @ lvl 2  2.5 12 :00   5 Weights TBD  TBD 5 :00   6 Post-Exercise        Resistance Training: Will start by session 10  Home Exercise Prescription given: To be given prior to discharge from program.    Exercise Plan    Goal Status: Initial Assessment; goals not yet started    Exercise Goals:   Increase mets to 5.0 by the end of the program.  Gain independence and confidence with exercise while in the program.  Have a plan to continue exercise after he completes the program.  Increase mets by 1.0 every 6 weeks as tolerated.      Exercise Interventions/Education:   \"What are exactly mets\" handout provided and discussed. Patient understood well. 08/05/2025  MARGRET handout provided and discussed. Patient verbalized understanding. 08/05/2025    Initial Assessment: No home exercise in place at this time. Will encourage patient to do so at a later time    Other Core Components/Risk Factor Assessment:    Medication adherence  Current Medications:   Medications at Time " of Discharge  Medication Sig Dispense Quantity Refills Last Filled Start Date End Date   zinc gluconate 50 MG tablet  Take 1 tablet by mouth daily             clopidogrel (PLAVIX) 75 MG tablet  TAKE 1 TABLET BY MOUTH EVERY DAY 30 tablet  3   04/21/2025     rosuvastatin (CRESTOR) 20 MG tablet  Take 1 tablet by mouth daily 30 tablet  11   03/31/2025     magnesium oxide (MAG-OX) 400 (240 Mg) MG tablet  TAKE 2 TABLETS BY MOUTH TWICE DAILY 120 tablet  11   01/15/2025     Blood Pressure Monitor KIT  Use daily as needed/directed by physician 1 kit      09/18/2023     glimepiride (AMARYL) 1 MG tablet  Take 1 tablet by mouth in the morning and at bedtime             metFORMIN (GLUCOPHAGE) 500 MG tablet  Take 1 tablet by mouth 2 times daily (with meals)             aspirin 81 MG tablet  Take 1 tablet by mouth daily             amLODIPine (NORVASC) 5 MG tablet  TAKE 1 TABLET BY MOUTH IN THE MORNING and AT BEDTIME 60 tablet  11   06/18/2025 07/23/2025   metoprolol tartrate (LOPRESSOR) 25 MG tablet  Take 0.5 tablets by mouth daily 45 tablet  3   08/07/2024 07/17/202                        Medication compliance: Yes   Uses pill box/organizer: Yes    Carries medication list: Yes     Blood Pressure Management  History of Hypertension: Yes   Medication Changes: No   Resting BP:  Visit Vitals  /85        Heart Failure Management  Hx of Heart Failure: No    Smoking/Tobacco Assessment  Never Smoked    Other Core Component Plan    Goal Status: Initial Assessment; goals not yet started    Other Core Component Goals:   Aim to Maintain a resting BP of <130/80 while in the program  Increase knowledge test score from   10/15 to 15/15 by discharge.  Monitor blood pressure pre, during and post workout.  Meet and discuss knowledge test within the first 12 sessions. Goal met 08/05/2025    Other Core Component Interventions/Education:   Educated the patient on the importance of carrying and updating medication list. 08/05/2025  2.   Handout  "given, \"Tips on taking medication.\" Patient understood well. 08/05/2025  3.   Handouts given, \"Benefits of Cardiac Rehab\" and [employer]'s, \"What is Cardiac Rehab?\" Patient acknowledged understanding.  08/05/2025  4.   Reviewed knowledge score reviewed and discussed with patient. Correct answers discussed.      Initial Assessment: Initial paperwork completed    Individual Patient Goals:    Be stronger  Better health    Goal Status: In progress    Staff Comments:  Patient oriented to 0600 class time.  Patient completed all entry paperwork. All entry paperwork will be reviewed with patient over the course of rehab. The patient was able to complete 6 minute walk with no shortness of breath. The patient walked 1040 feet at a pace of 2.5 mph and obtained 2.5 mets. Patient tolerated well. Patient still has angina at times that improves with rest. Dr. Titi Hart, referring physician is aware. Will monitor and follow up as needed and will follow cardiac rehab protocols. Patient has participated in a cardiac rehab in 2023 at another facility and in 2024 at this facility .Patient will being rehab on 08/08/2025                      .      Rehab Staff Signature: Radha Jacobson, RRT        "

## 2025-08-08 ENCOUNTER — APPOINTMENT (OUTPATIENT)
Dept: CARDIAC REHAB | Facility: HOSPITAL | Age: 65
End: 2025-08-08
Payer: MEDICARE

## 2025-08-11 ENCOUNTER — CLINICAL SUPPORT (OUTPATIENT)
Dept: CARDIAC REHAB | Facility: HOSPITAL | Age: 65
End: 2025-08-11
Payer: MEDICARE

## 2025-08-11 VITALS — SYSTOLIC BLOOD PRESSURE: 158 MMHG | DIASTOLIC BLOOD PRESSURE: 84 MMHG

## 2025-08-11 DIAGNOSIS — Z98.61 POST PTCA: Primary | ICD-10-CM

## 2025-08-11 PROCEDURE — 93798 PHYS/QHP OP CAR RHAB W/ECG: CPT | Performed by: STUDENT IN AN ORGANIZED HEALTH CARE EDUCATION/TRAINING PROGRAM

## 2025-08-11 PROCEDURE — 94618 PULMONARY STRESS TESTING: CPT | Performed by: STUDENT IN AN ORGANIZED HEALTH CARE EDUCATION/TRAINING PROGRAM

## 2025-08-13 ENCOUNTER — CLINICAL SUPPORT (OUTPATIENT)
Dept: CARDIAC REHAB | Facility: HOSPITAL | Age: 65
End: 2025-08-13
Payer: MEDICARE

## 2025-08-13 VITALS — SYSTOLIC BLOOD PRESSURE: 162 MMHG | DIASTOLIC BLOOD PRESSURE: 90 MMHG

## 2025-08-13 DIAGNOSIS — Z98.61 POST PTCA: Primary | ICD-10-CM

## 2025-08-13 PROCEDURE — 93798 PHYS/QHP OP CAR RHAB W/ECG: CPT | Performed by: STUDENT IN AN ORGANIZED HEALTH CARE EDUCATION/TRAINING PROGRAM

## 2025-08-15 ENCOUNTER — CLINICAL SUPPORT (OUTPATIENT)
Dept: CARDIAC REHAB | Facility: HOSPITAL | Age: 65
End: 2025-08-15
Payer: MEDICARE

## 2025-08-15 VITALS — SYSTOLIC BLOOD PRESSURE: 147 MMHG | DIASTOLIC BLOOD PRESSURE: 81 MMHG

## 2025-08-15 DIAGNOSIS — Z98.61 POST PTCA: Primary | ICD-10-CM

## 2025-08-15 PROCEDURE — 93798 PHYS/QHP OP CAR RHAB W/ECG: CPT | Performed by: STUDENT IN AN ORGANIZED HEALTH CARE EDUCATION/TRAINING PROGRAM

## 2025-08-18 ENCOUNTER — CLINICAL SUPPORT (OUTPATIENT)
Dept: CARDIAC REHAB | Facility: HOSPITAL | Age: 65
End: 2025-08-18
Payer: MEDICARE

## 2025-08-18 VITALS — SYSTOLIC BLOOD PRESSURE: 158 MMHG | DIASTOLIC BLOOD PRESSURE: 81 MMHG

## 2025-08-18 DIAGNOSIS — Z98.61 POST PTCA: Primary | ICD-10-CM

## 2025-08-18 PROCEDURE — 93798 PHYS/QHP OP CAR RHAB W/ECG: CPT | Performed by: STUDENT IN AN ORGANIZED HEALTH CARE EDUCATION/TRAINING PROGRAM

## 2025-08-20 ENCOUNTER — CLINICAL SUPPORT (OUTPATIENT)
Dept: CARDIAC REHAB | Facility: HOSPITAL | Age: 65
End: 2025-08-20
Payer: MEDICARE

## 2025-08-20 DIAGNOSIS — Z98.61 POST PTCA: Primary | ICD-10-CM

## 2025-08-20 PROCEDURE — 93798 PHYS/QHP OP CAR RHAB W/ECG: CPT | Performed by: STUDENT IN AN ORGANIZED HEALTH CARE EDUCATION/TRAINING PROGRAM

## 2025-08-22 ENCOUNTER — CLINICAL SUPPORT (OUTPATIENT)
Dept: CARDIAC REHAB | Facility: HOSPITAL | Age: 65
End: 2025-08-22
Payer: MEDICARE

## 2025-08-22 VITALS — DIASTOLIC BLOOD PRESSURE: 80 MMHG | SYSTOLIC BLOOD PRESSURE: 142 MMHG

## 2025-08-22 DIAGNOSIS — Z98.61 POST PTCA: Primary | ICD-10-CM

## 2025-08-22 PROCEDURE — 93798 PHYS/QHP OP CAR RHAB W/ECG: CPT | Performed by: STUDENT IN AN ORGANIZED HEALTH CARE EDUCATION/TRAINING PROGRAM

## 2025-08-25 ENCOUNTER — CLINICAL SUPPORT (OUTPATIENT)
Dept: CARDIAC REHAB | Facility: HOSPITAL | Age: 65
End: 2025-08-25
Payer: MEDICARE

## 2025-08-25 VITALS — SYSTOLIC BLOOD PRESSURE: 135 MMHG | DIASTOLIC BLOOD PRESSURE: 79 MMHG

## 2025-08-25 DIAGNOSIS — Z98.61 POST PTCA: Primary | ICD-10-CM

## 2025-08-25 PROCEDURE — 93798 PHYS/QHP OP CAR RHAB W/ECG: CPT | Performed by: STUDENT IN AN ORGANIZED HEALTH CARE EDUCATION/TRAINING PROGRAM

## 2025-08-27 ENCOUNTER — CLINICAL SUPPORT (OUTPATIENT)
Dept: CARDIAC REHAB | Facility: HOSPITAL | Age: 65
End: 2025-08-27
Payer: MEDICARE

## 2025-08-27 VITALS — SYSTOLIC BLOOD PRESSURE: 124 MMHG | DIASTOLIC BLOOD PRESSURE: 73 MMHG

## 2025-08-27 DIAGNOSIS — Z98.61 POST PTCA: Primary | ICD-10-CM

## 2025-08-27 PROCEDURE — 93798 PHYS/QHP OP CAR RHAB W/ECG: CPT | Performed by: STUDENT IN AN ORGANIZED HEALTH CARE EDUCATION/TRAINING PROGRAM

## 2025-08-29 ENCOUNTER — CLINICAL SUPPORT (OUTPATIENT)
Dept: CARDIAC REHAB | Facility: HOSPITAL | Age: 65
End: 2025-08-29
Payer: MEDICARE

## 2025-08-29 VITALS
HEIGHT: 68 IN | WEIGHT: 256.2 LBS | DIASTOLIC BLOOD PRESSURE: 64 MMHG | SYSTOLIC BLOOD PRESSURE: 108 MMHG | BODY MASS INDEX: 38.83 KG/M2

## 2025-08-29 DIAGNOSIS — Z98.61 POST PTCA: Primary | ICD-10-CM

## 2025-08-29 PROCEDURE — 93798 PHYS/QHP OP CAR RHAB W/ECG: CPT | Performed by: STUDENT IN AN ORGANIZED HEALTH CARE EDUCATION/TRAINING PROGRAM

## 2025-09-03 ENCOUNTER — CLINICAL SUPPORT (OUTPATIENT)
Dept: CARDIAC REHAB | Facility: HOSPITAL | Age: 65
End: 2025-09-03
Payer: MEDICARE

## 2025-09-03 VITALS — DIASTOLIC BLOOD PRESSURE: 90 MMHG | SYSTOLIC BLOOD PRESSURE: 163 MMHG

## 2025-09-03 DIAGNOSIS — Z98.61 POST PTCA: Primary | ICD-10-CM

## 2025-09-03 PROCEDURE — 93798 PHYS/QHP OP CAR RHAB W/ECG: CPT | Performed by: STUDENT IN AN ORGANIZED HEALTH CARE EDUCATION/TRAINING PROGRAM

## 2025-11-07 ENCOUNTER — APPOINTMENT (OUTPATIENT)
Dept: CARDIAC REHAB | Facility: HOSPITAL | Age: 65
End: 2025-11-07
Payer: MEDICARE